# Patient Record
Sex: MALE | Race: WHITE | NOT HISPANIC OR LATINO | Employment: OTHER | URBAN - METROPOLITAN AREA
[De-identification: names, ages, dates, MRNs, and addresses within clinical notes are randomized per-mention and may not be internally consistent; named-entity substitution may affect disease eponyms.]

---

## 2017-03-23 ENCOUNTER — HOSPITAL ENCOUNTER (EMERGENCY)
Facility: OTHER | Age: 47
Discharge: HOME | End: 2017-03-23
Attending: EMERGENCY MEDICINE
Payer: OTHER MISCELLANEOUS

## 2017-03-23 VITALS
HEIGHT: 67 IN | WEIGHT: 178 LBS | BODY MASS INDEX: 27.94 KG/M2 | RESPIRATION RATE: 16 BRPM | DIASTOLIC BLOOD PRESSURE: 104 MMHG | TEMPERATURE: 98 F | OXYGEN SATURATION: 99 % | HEART RATE: 62 BPM | SYSTOLIC BLOOD PRESSURE: 179 MMHG

## 2017-03-23 DIAGNOSIS — M25.561 ACUTE PAIN OF RIGHT KNEE: ICD-10-CM

## 2017-03-23 DIAGNOSIS — M25.572 ACUTE LEFT ANKLE PAIN: ICD-10-CM

## 2017-03-23 DIAGNOSIS — T07.XXXA ABRASIONS OF MULTIPLE SITES: ICD-10-CM
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
## 2017-03-23 PROCEDURE — 25000003 PHARM REV CODE 250: Performed by: PHYSICIAN ASSISTANT

## 2017-03-23 PROCEDURE — 99283 EMERGENCY DEPT VISIT LOW MDM: CPT

## 2017-03-23 RX ORDER — HYDROCODONE BITARTRATE AND ACETAMINOPHEN 5; 325 MG/1; MG/1
2 TABLET ORAL
Status: DISCONTINUED | OUTPATIENT
Start: 2017-03-23 | End: 2017-03-23 | Stop reason: HOSPADM

## 2017-03-23 RX ORDER — IBUPROFEN 800 MG/1
800 TABLET ORAL EVERY 6 HOURS PRN
Qty: 20 TABLET | Refills: 0 | Status: SHIPPED | OUTPATIENT
Start: 2017-03-23 | End: 2018-11-18

## 2017-03-23 RX ORDER — IBUPROFEN 600 MG/1
600 TABLET ORAL
Status: COMPLETED | OUTPATIENT
Start: 2017-03-23 | End: 2017-03-23

## 2017-03-23 RX ORDER — HYDROCODONE BITARTRATE AND ACETAMINOPHEN 5; 325 MG/1; MG/1
1 TABLET ORAL EVERY 6 HOURS PRN
Qty: 12 TABLET | Refills: 0 | Status: SHIPPED | OUTPATIENT
Start: 2017-03-23 | End: 2017-04-02

## 2017-03-23 RX ADMIN — IBUPROFEN 600 MG: 600 TABLET, FILM COATED ORAL at 05:03
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
## 2018-01-02 ENCOUNTER — HOSPITAL ENCOUNTER (OUTPATIENT)
Facility: HOSPITAL | Age: 48
Discharge: HOME | End: 2018-01-02
Attending: EMERGENCY MEDICINE | Admitting: EMERGENCY MEDICINE

## 2018-01-02 ENCOUNTER — DOCUMENTATION ONLY (OUTPATIENT)
Dept: CARDIOLOGY | Facility: CLINIC | Age: 48
End: 2018-01-02

## 2018-01-02 VITALS
HEIGHT: 67 IN | WEIGHT: 180 LBS | OXYGEN SATURATION: 95 % | SYSTOLIC BLOOD PRESSURE: 112 MMHG | RESPIRATION RATE: 15 BRPM | BODY MASS INDEX: 28.25 KG/M2 | TEMPERATURE: 98 F | HEART RATE: 64 BPM | DIASTOLIC BLOOD PRESSURE: 66 MMHG

## 2018-01-02 DIAGNOSIS — J06.9 VIRAL URI WITH COUGH: ICD-10-CM

## 2018-01-02 DIAGNOSIS — R07.9 CHEST PAIN: Primary | ICD-10-CM

## 2018-01-02 PROBLEM — I10 ESSENTIAL HYPERTENSION: Status: ACTIVE | Noted: 2018-01-02

## 2018-01-02 PROBLEM — G44.89 OTHER HEADACHE SYNDROME: Status: ACTIVE | Noted: 2018-01-02

## 2018-01-02 PROBLEM — E78.49 OTHER HYPERLIPIDEMIA: Status: ACTIVE | Noted: 2018-01-02

## 2018-01-02 LAB
ALBUMIN SERPL BCP-MCNC: 3.9 G/DL
ALP SERPL-CCNC: 56 U/L
ALT SERPL W/O P-5'-P-CCNC: 58 U/L
ANION GAP SERPL CALC-SCNC: 10 MMOL/L
AST SERPL-CCNC: 28 U/L
BASOPHILS # BLD AUTO: 0.03 K/UL
BASOPHILS NFR BLD: 0.3 %
BILIRUB SERPL-MCNC: 0.5 MG/DL
BNP SERPL-MCNC: <10 PG/ML
BUN SERPL-MCNC: 18 MG/DL
CALCIUM SERPL-MCNC: 9.9 MG/DL
CHLORIDE SERPL-SCNC: 103 MMOL/L
CHOLEST SERPL-MCNC: 198 MG/DL
CHOLEST/HDLC SERPL: 6.6 {RATIO}
CO2 SERPL-SCNC: 22 MMOL/L
CREAT SERPL-MCNC: 1.1 MG/DL
DIASTOLIC DYSFUNCTION: NO
DIFFERENTIAL METHOD: ABNORMAL
EOSINOPHIL # BLD AUTO: 0.1 K/UL
EOSINOPHIL NFR BLD: 1.1 %
ERYTHROCYTE [DISTWIDTH] IN BLOOD BY AUTOMATED COUNT: 12.6 %
EST. GFR  (AFRICAN AMERICAN): >60 ML/MIN/1.73 M^2
EST. GFR  (NON AFRICAN AMERICAN): >60 ML/MIN/1.73 M^2
ESTIMATED PA SYSTOLIC PRESSURE: 27.8
GLUCOSE SERPL-MCNC: 90 MG/DL
HCT VFR BLD AUTO: 42.3 %
HDLC SERPL-MCNC: 30 MG/DL
HDLC SERPL: 15.2 %
HGB BLD-MCNC: 15 G/DL
IMM GRANULOCYTES # BLD AUTO: 0.05 K/UL
IMM GRANULOCYTES NFR BLD AUTO: 0.6 %
INR PPP: 1
LDLC SERPL CALC-MCNC: 142.4 MG/DL
LIPASE SERPL-CCNC: 15 U/L
LYMPHOCYTES # BLD AUTO: 1.8 K/UL
LYMPHOCYTES NFR BLD: 19.8 %
MCH RBC QN AUTO: 31 PG
MCHC RBC AUTO-ENTMCNC: 35.5 G/DL
MCV RBC AUTO: 87 FL
MONOCYTES # BLD AUTO: 0.8 K/UL
MONOCYTES NFR BLD: 9.2 %
NEUTROPHILS # BLD AUTO: 6.3 K/UL
NEUTROPHILS NFR BLD: 69 %
NONHDLC SERPL-MCNC: 168 MG/DL
NRBC BLD-RTO: 0 /100 WBC
PLATELET # BLD AUTO: 163 K/UL
PMV BLD AUTO: 9.5 FL
POTASSIUM SERPL-SCNC: 3.9 MMOL/L
PROT SERPL-MCNC: 8.2 G/DL
PROTHROMBIN TIME: 10.7 SEC
RBC # BLD AUTO: 4.84 M/UL
RETIRED EF AND QEF - SEE NOTES: 65 (ref 55–65)
SODIUM SERPL-SCNC: 135 MMOL/L
TRIGL SERPL-MCNC: 128 MG/DL
TROPONIN I SERPL DL<=0.01 NG/ML-MCNC: <0.006 NG/ML
TROPONIN I SERPL DL<=0.01 NG/ML-MCNC: <0.006 NG/ML
WBC # BLD AUTO: 9.09 K/UL

## 2018-01-02 PROCEDURE — 25000003 PHARM REV CODE 250: Performed by: EMERGENCY MEDICINE

## 2018-01-02 PROCEDURE — 96374 THER/PROPH/DIAG INJ IV PUSH: CPT

## 2018-01-02 PROCEDURE — 93351 STRESS TTE COMPLETE: CPT | Mod: 26,,, | Performed by: INTERNAL MEDICINE

## 2018-01-02 PROCEDURE — 99284 EMERGENCY DEPT VISIT MOD MDM: CPT | Mod: 25

## 2018-01-02 PROCEDURE — 63600175 PHARM REV CODE 636 W HCPCS: Performed by: HOSPITALIST

## 2018-01-02 PROCEDURE — 80061 LIPID PANEL: CPT

## 2018-01-02 PROCEDURE — 93325 DOPPLER ECHO COLOR FLOW MAPG: CPT | Mod: 26,,, | Performed by: INTERNAL MEDICINE

## 2018-01-02 PROCEDURE — 93010 ELECTROCARDIOGRAM REPORT: CPT | Mod: ,,, | Performed by: INTERNAL MEDICINE

## 2018-01-02 PROCEDURE — 99220 PR INITIAL OBSERVATION CARE,LEVL III: CPT | Mod: ,,, | Performed by: HOSPITALIST

## 2018-01-02 PROCEDURE — G0378 HOSPITAL OBSERVATION PER HR: HCPCS

## 2018-01-02 PROCEDURE — 96372 THER/PROPH/DIAG INJ SC/IM: CPT

## 2018-01-02 PROCEDURE — 83690 ASSAY OF LIPASE: CPT

## 2018-01-02 PROCEDURE — 93320 DOPPLER ECHO COMPLETE: CPT | Mod: 26,,, | Performed by: INTERNAL MEDICINE

## 2018-01-02 PROCEDURE — 93325 DOPPLER ECHO COLOR FLOW MAPG: CPT

## 2018-01-02 PROCEDURE — 80053 COMPREHEN METABOLIC PANEL: CPT

## 2018-01-02 PROCEDURE — 85025 COMPLETE CBC W/AUTO DIFF WBC: CPT

## 2018-01-02 PROCEDURE — 84484 ASSAY OF TROPONIN QUANT: CPT | Mod: 91

## 2018-01-02 PROCEDURE — 25000003 PHARM REV CODE 250: Performed by: HOSPITALIST

## 2018-01-02 PROCEDURE — 93005 ELECTROCARDIOGRAM TRACING: CPT

## 2018-01-02 PROCEDURE — 83880 ASSAY OF NATRIURETIC PEPTIDE: CPT

## 2018-01-02 PROCEDURE — 99285 EMERGENCY DEPT VISIT HI MDM: CPT | Mod: ,,, | Performed by: EMERGENCY MEDICINE

## 2018-01-02 PROCEDURE — 84484 ASSAY OF TROPONIN QUANT: CPT

## 2018-01-02 PROCEDURE — 85610 PROTHROMBIN TIME: CPT

## 2018-01-02 RX ORDER — BUTALBITAL, ACETAMINOPHEN AND CAFFEINE 50; 325; 40 MG/1; MG/1; MG/1
1 TABLET ORAL
Status: COMPLETED | OUTPATIENT
Start: 2018-01-02 | End: 2018-01-02

## 2018-01-02 RX ORDER — CETIRIZINE HYDROCHLORIDE 5 MG/1
5 TABLET ORAL DAILY
Status: DISCONTINUED | OUTPATIENT
Start: 2018-01-02 | End: 2018-01-02 | Stop reason: HOSPADM

## 2018-01-02 RX ORDER — SODIUM CHLORIDE 0.9 % (FLUSH) 0.9 %
3 SYRINGE (ML) INJECTION
Status: DISCONTINUED | OUTPATIENT
Start: 2018-01-02 | End: 2018-01-02 | Stop reason: HOSPADM

## 2018-01-02 RX ORDER — NITROGLYCERIN 0.4 MG/1
0.4 TABLET SUBLINGUAL EVERY 5 MIN PRN
Status: DISCONTINUED | OUTPATIENT
Start: 2018-01-02 | End: 2018-01-02 | Stop reason: HOSPADM

## 2018-01-02 RX ORDER — ATORVASTATIN CALCIUM 20 MG/1
20 TABLET, FILM COATED ORAL DAILY
Status: DISCONTINUED | OUTPATIENT
Start: 2018-01-02 | End: 2018-01-02 | Stop reason: HOSPADM

## 2018-01-02 RX ORDER — ASPIRIN 325 MG
325 TABLET ORAL
Status: COMPLETED | OUTPATIENT
Start: 2018-01-02 | End: 2018-01-02

## 2018-01-02 RX ORDER — FLUTICASONE PROPIONATE 50 MCG
2 SPRAY, SUSPENSION (ML) NASAL DAILY
Status: DISCONTINUED | OUTPATIENT
Start: 2018-01-02 | End: 2018-01-02 | Stop reason: HOSPADM

## 2018-01-02 RX ORDER — ONDANSETRON 2 MG/ML
4 INJECTION INTRAMUSCULAR; INTRAVENOUS EVERY 8 HOURS PRN
Status: DISCONTINUED | OUTPATIENT
Start: 2018-01-02 | End: 2018-01-02 | Stop reason: HOSPADM

## 2018-01-02 RX ORDER — LISINOPRIL 5 MG/1
5 TABLET ORAL DAILY
Status: DISCONTINUED | OUTPATIENT
Start: 2018-01-02 | End: 2018-01-02 | Stop reason: HOSPADM

## 2018-01-02 RX ORDER — TRAMADOL HYDROCHLORIDE 50 MG/1
50 TABLET ORAL EVERY 6 HOURS PRN
Status: DISCONTINUED | OUTPATIENT
Start: 2018-01-02 | End: 2018-01-02 | Stop reason: HOSPADM

## 2018-01-02 RX ORDER — ENOXAPARIN SODIUM 100 MG/ML
40 INJECTION SUBCUTANEOUS EVERY 24 HOURS
Status: DISCONTINUED | OUTPATIENT
Start: 2018-01-02 | End: 2018-01-02 | Stop reason: HOSPADM

## 2018-01-02 RX ORDER — HYDROCODONE BITARTRATE AND ACETAMINOPHEN 10; 325 MG/1; MG/1
1 TABLET ORAL EVERY 6 HOURS PRN
Status: DISCONTINUED | OUTPATIENT
Start: 2018-01-02 | End: 2018-01-02 | Stop reason: HOSPADM

## 2018-01-02 RX ORDER — ACETAMINOPHEN 325 MG/1
650 TABLET ORAL EVERY 4 HOURS PRN
Status: DISCONTINUED | OUTPATIENT
Start: 2018-01-02 | End: 2018-01-02 | Stop reason: HOSPADM

## 2018-01-02 RX ADMIN — ASPIRIN 325 MG ORAL TABLET 325 MG: 325 PILL ORAL at 01:01

## 2018-01-02 RX ADMIN — NITROGLYCERIN 0.4 MG: 0.4 TABLET SUBLINGUAL at 02:01

## 2018-01-02 RX ADMIN — ATORVASTATIN CALCIUM 20 MG: 20 TABLET, FILM COATED ORAL at 10:01

## 2018-01-02 RX ADMIN — HYDROCODONE BITARTRATE AND ACETAMINOPHEN 1 TABLET: 10; 325 TABLET ORAL at 09:01

## 2018-01-02 RX ADMIN — NITROGLYCERIN 0.5 INCH: 20 OINTMENT TOPICAL at 05:01

## 2018-01-02 RX ADMIN — LISINOPRIL 5 MG: 5 TABLET ORAL at 09:01

## 2018-01-02 RX ADMIN — HYDROCODONE BITARTRATE AND ACETAMINOPHEN 1 TABLET: 10; 325 TABLET ORAL at 05:01

## 2018-01-02 RX ADMIN — ONDANSETRON 4 MG: 2 INJECTION INTRAMUSCULAR; INTRAVENOUS at 07:01

## 2018-01-02 RX ADMIN — ENOXAPARIN SODIUM 40 MG: 100 INJECTION SUBCUTANEOUS at 05:01

## 2018-01-02 RX ADMIN — BUTALBITAL, ACETAMINOPHEN AND CAFFEINE 1 TABLET: 50; 325; 40 TABLET ORAL at 05:01

## 2018-01-02 RX ADMIN — ACETAMINOPHEN 650 MG: 325 TABLET ORAL at 07:01

## 2018-01-02 RX ADMIN — CETIRIZINE HYDROCHLORIDE 5 MG: 5 TABLET ORAL at 09:01

## 2018-01-02 NOTE — ED NOTES
Pt placed on cardiac monitor, continuous pulse ox, cycling blood pressures. Side rails up x2, call bell in reach, bed in low position with brake engaged. Pt remains NPO, waiting for room assignment in hospital. Offers no c/o's at this time.

## 2018-01-02 NOTE — ED TRIAGE NOTES
Danielito Rich, a 47 y.o. male presents to the ED      Chief Complaint   Patient presents with    Chest Pain     Midsternal chest pain that radiates across his chest described as sharp and shooting accompanied by headache and nausea without emesis. Pt reports hx of htn and high cholesterol, reports he has not taken htn or hdl medications since 2013.      Shortness of Breath     SOB worse when walking.      Review of patient's allergies indicates:   Allergen Reactions    Codeine Itching     Past Medical History:   Diagnosis Date    Hypertension       Adult Physical Assessment  LOC: Danielito Rich, 47 y.o. male verified via two identifiers.  The patient is awake, alert, oriented and speaking appropriately at this time.  APPEARANCE: Patient resting comfortably and appears to be in no acute distress at this time. Patient is clean and well groomed, patient's clothing is properly fastened.  SKIN:The skin is warm and dry, color consistent with ethnicity, patient has normal skin turgor and moist mucus membranes, skin intact, no breakdown or brusing noted.  MUSCULOSKELETAL: Patient moving all extremities well, no obvious swelling or deformities noted.  RESPIRATORY: Airway is open and patent, respirations are spontaneous, patient has a normal effort and rate, no accessory muscle use noted.  CARDIAC: Patient has a normal rate and rhythm, no periphreal edema noted in any extremity, capillary refill < 3 seconds in all extremities  ABDOMEN: Soft and non tender to palpation, no abdominal distention noted. Bowel sounds present in all four quadrants.  NEUROLOGIC: Eyes open spontaneously, behavior appropriate to situation, follows commands, facial expression symmetrical, bilateral hand grasp equal and even, purposeful motor response noted, normal sensation in all extremities when touched with a finger.
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
## 2018-01-02 NOTE — PROVIDER PROGRESS NOTES - EMERGENCY DEPT.
Encounter Date: 1/2/2018    ED Physician Progress Notes         EKG - STEMI Decision  Initial Reading: No STEMI present.

## 2018-01-02 NOTE — PROGRESS NOTES
Patient identified by 2 identifiers. Denies previous reactions to blood transfusions and allergies reviewed.  Procedure explained & consent obtained.  20 g IV in place to Rt AC, flushed w/ 10cc NS pre & post contrast administration.  3cc Optison administered, echo images obtained.  Pt tolerated procedure well.

## 2018-01-02 NOTE — ED NOTES
Pt placed on tele box #[id number]- stress echo lab notified that pt may now go to stress. Monitoring confirmed w/ tele room

## 2018-01-02 NOTE — ED PROVIDER NOTES
Encounter Date: 1/2/2018    SCRIBE #1 NOTE: I, Dorina Thompson, am scribing for, and in the presence of,  Dr. Jalloh . I have scribed the entire note.       History     Chief Complaint   Patient presents with    Chest Pain     Midsternal chest pain that radiates across his chest described as sharp and shooting accompanied by headache and nausea without emesis. Pt reports hx of htn and high cholesterol, reports he has not taken htn or hdl medications since 2013.      Shortness of Breath     SOB worse when walking.      The history is provided by the patient and medical records.      Time patient was seen by the provider: 1:40 AM      The patient is a 47 y.o. male with hx of: HTN and hypercholesterolemia who presents to the ED with a complaint of chest pain and shortness of breath. Patient states that he has been sick since Friday with a cold. He started taking Robitussin Saturday and has had a productive cough with yellowish, greenish phlegm. He also has associated symptoms of intermittent chest pain with radiation to shoulder that started at noon, fatigue, generalized body aches, diarrhea, vomiting, sore throat, loss of appetite, and headache. Patient states that his headache is generalized and feels like his head is going to blow, not like a headache he has had before. His most recent episode of chest pain was while waiting in the waiting room, but he did not notice the time. He states that he actively has pressure in his chest at this time. He denies fever and rhinorrhea. He reports that he loss his dad due to heart failure and also does not have any insurance at this time. He has not been compliant with his medications since 2003.    States his CP radiates to his back and left shoulder.  Did state he was admitted to [facility] recently for CP and they wanted to do stress test on him, but he refused at that time and left AMA because he is  and had to work.    He is amenable to being admitted for ACS  w/u at this time.    Review of patient's allergies indicates:   Allergen Reactions    Codeine Itching     No past medical history on file.  Past Surgical History:   Procedure Laterality Date    APPENDECTOMY      FRACTURE SURGERY       No family history on file.  Social History   Substance Use Topics    Smoking status: Never Smoker    Smokeless tobacco: Not on file    Alcohol use No     Review of Systems   Constitutional: Positive for appetite change and fever. Negative for fatigue.   HENT: Positive for sore throat. Negative for rhinorrhea.    Respiratory: Positive for cough, chest tightness and shortness of breath.    Cardiovascular: Positive for chest pain.   Gastrointestinal: Positive for diarrhea and vomiting.   Musculoskeletal:        (+)Genrealized body aches   Neurological: Positive for headaches.   All other systems reviewed and are negative.      Physical Exam     Initial Vitals [01/02/18 0024]   BP Pulse Resp Temp SpO2   (!) 157/99 91 20 98 °F (36.7 °C) 98 %      MAP       118.33         Physical Exam    Nursing note and vitals reviewed.      Gen/Constitutional: Interactive. No acute distress  Head: Normocephalic, Atraumatic  Neck: supple, no masses or LAD  Eyes: PERRLA, conjunctiva clear  Ears, Nose and Throat: No rhinorrhea or stridor.  Cardiac: Reg Rhythm, No murmur  Pulmonary: CTA Bilat, no wheezes, rales. Minimal scattered rhonchi.   GI: Abdomen soft, non-tender, non-distended; no rebound or guarding  : No CVA tenderness.  Musculoskeletal: Extremities warm, well perfused, no erythema, no edema  Skin: No rashes  Neuro: Alert and Oriented x 3; No focal motor or sensory deficits.    Psych: Normal affect    ED Course   Procedures  Labs Reviewed   CBC W/ AUTO DIFFERENTIAL - Abnormal; Notable for the following:        Result Value    Immature Granulocytes 0.6 (*)     Immature Grans (Abs) 0.05 (*)     All other components within normal limits   COMPREHENSIVE METABOLIC PANEL - Abnormal; Notable for the  following:     Sodium 135 (*)     CO2 22 (*)     ALT 58 (*)     All other components within normal limits   B-TYPE NATRIURETIC PEPTIDE   LIPASE   PROTIME-INR   TROPONIN I   TROPONIN I         Imaging Results          X-Ray Chest 1 View (Final result)  Result time 01/02/18 01:40:14    Final result by Brennan Heart MD (01/02/18 01:40:14)                 Impression:      No radiographic evidence of acute intrathoracic process.      Electronically signed by: BRENNAN HEART  Date:     01/02/18  Time:    01:40              Narrative:    Comparison:None    Technique: Single AP portable chest radiograph.    Findings:   Cardiac monitoring leads overlie the chest. The cardiomediastinal silhouette appears within normal limits. The lungs are symmetrically expanded without evidence of focal airspace consolidation or pleural effusion. No evidence of pneumothorax. The visualized osseous structures are intact.                                   Medical Decision Making:   History:   Old Medical Records: I decided to obtain old medical records.  Initial Assessment:   Patient presents with cough, chest congestion, headache that is not thunderclap and consistent with prior headaches he has had when his blood pressure gets high, as well as chest pressure and intermittent chest pain that is substernal and radiates to his back shoulder. I considered viral URI, influenza, PE, MI/ACS, aortic dissection, pneumonia, CHF exacerbation, among other diagnoses. Plan to check cardiac labs, chest x-ray. I will treat HTN and chest pain with nitroglycerin, Asprin given.     EKG non-ischemic.  Initial trop negative.  Last episode of CP was just prior to being roomed, so about an hour ago.    Given hx of HTN and HLD and never had cardiac risk stratification and non-compliance with BP medications, I felt he warranted admit for r/o ACS.    HIs CP improved with SL Nitro and he is CP free with nitro paste.    Medicine accepted for r/o ACS  admission.  Clinical Tests:   Lab Tests: Ordered and Reviewed  Radiological Study: Ordered and Reviewed  Medical Tests: Ordered and Reviewed            Scribe Attestation:   Scribe #1: I performed the above scribed service and the documentation accurately describes the services I performed. I attest to the accuracy of the note.    I, Dr. Rambo Jalloh, personally performed the services described in this documentation. All medical record entries made by the scribe were at my direction and in my presence.  I have reviewed the chart and agree that the record reflects my personal performance and is accurate and complete. Rambo Jalloh MD.  7:18 AM 01/02/2018            ED Course      Clinical Impression:   The primary encounter diagnosis was Chest pain. A diagnosis of Viral URI with cough was also pertinent to this visit.                           Rambo Jalloh MD  01/02/18 0719

## 2018-01-02 NOTE — H&P
"History & Physical  [facility] [place] [facility]    SUBJECTIVE:   Chief Complaint/Reason for Admission: chest pain    History of Present Illness:  Patient is a 47 y.o. male with PMH of untreated HTN and HLD presents to the ED with chest pain.  Pt reports that he has been ill for the past 4 days with a cold, with non-productive cough and sinus pressure.  Over the past 2 days he has also noticed chest pain which began at rest.  Pain is characterized as "heavy" and pressure-like, mid-sternal, with intermittent sharp pains.  No clear exacerbating or relieving factors.  He was given NTG in ED and since then chest pain has improved.  He reports that his PCP wanted him to have a stress test recently, but it has not been done yet.  No FH of MI/CAD.  Does not normally have chest pain with exertion.  Non-smoker, no alcohol or drug use.  Works as a ; feels his job is high-stress.     In addition, pt has had a throbbing HA for the past 2 days.  He reports that he had his blood pressure checked at [facility], where it was found to be 167/123 and it was recommended he come to ED.  +similar HA in past, but details or cause not known.  Pain is at the top of his head, feels like it will "blow up".  No vision changes, confusion, fever, or chills.  He stopped anti-hypertensives in 2003 at time of divorce.    Old chart was reviewed.    Past Medical History:   Diagnosis Date    Hypertension        Past Surgical History:   Procedure Laterality Date    APPENDECTOMY      FRACTURE SURGERY        No family history on file.    Social History   Substance Use Topics    Smoking status: Never Smoker    Smokeless tobacco: Not on file    Alcohol use No      Review of patient's allergies indicates:   Allergen Reactions    Codeine Itching       No current facility-administered medications on file prior to encounter.      Current Outpatient Prescriptions on File Prior to Encounter   Medication Sig Dispense Refill    hydrocortisone-pramoxine " (PROCTOFOAM-HS) rectal foam Place 1 applicator rectally 2 (two) times daily. 10 g 1    ibuprofen (ADVIL,MOTRIN) 800 MG tablet Take 1 tablet (800 mg total) by mouth every 6 (six) hours as needed for Pain (take with food). 20 tablet 0        Review of Systems:  Constitutional: no fever or chills  Eyes: no visual changes  ENT: positive for nasal congestion and sore throat, negative for hearing loss and voice change  Respiratory: positive for cough  Cardiovascular: positive for chest pain and chest pressure/discomfort  Gastrointestinal: no nausea or vomiting, no abdominal pain or change in bowel habits  Genitourinary: no hematuria or dysuria  Musculoskeletal: no arthralgias or myalgias  Neurological: no seizures or tremors  Endocrine: no heat or cold intolerance     OBJECTIVE:     Vital Signs (Most Recent)   Temp: 98 °F (36.7 °C) (01/02/18 0024)  Pulse: 81 (01/02/18 0717)  Resp: 20 (01/02/18 0024)  BP: 122/75 (01/02/18 0717)  SpO2: 96 % (01/02/18 0717)  Body mass index is 28.19 kg/m².      Physical Exam:  Gen- well-developed, well-nourished, fatigued  Head- NC/AT, PERRL, EOMI, no oral lesions.  +maxillary sinus tenderness  Neck- supple, trachea midline, no cervical LAD  CVS- S1 and S2 present, RRR, no murmurs  Resp- coarse breath sounds b/l, no work of breathing  Abd- BS+, soft, NT, ND  Ext- no clubbing, cyanosis, or edema  Skin- warm, dry, no lesions  Neuro- alert and oriented x 3, CN grossly intact, no focal deficits    Laboratory:  CBC/Anemia Labs: Coags:      Recent Labs  Lab 01/02/18 0133   WBC 9.09   HGB 15.0   HCT 42.3      MCV 87   RDW 12.6      Recent Labs  Lab 01/02/18 0133   INR 1.0        Chemistries: ABG:     Recent Labs  Lab 01/02/18 0133   *   K 3.9      CO2 22*   BUN 18   CREATININE 1.1   CALCIUM 9.9   PROT 8.2   BILITOT 0.5   ALKPHOS 56   ALT 58*   AST 28    No results for input(s): PH, PCO2, PO2, HCO3, POCSATURATED, BE in the last 168 hours.       Cardiac Enzymes: Ejection  Fractions:    Recent Labs      01/02/18   0133  01/02/18   0746   TROPONINI  <0.006  <0.006    No results found for: EF       Diagnostic Results:  Labs: Reviewed  ECG: Reviewed  X-Ray: Reviewed      ASSESSMENT/PLAN:     Chest Pain  -pt has RF of HTN, HLD, but otherwise low MIKEY score  -Trop negative x 2  -EKG without ischemic changes  -ASA 325mg x1, NTG PRN  -DDX includes coronary ischemia, costochondritis, musculoskeletal, or GERD  -will plan for exercise stress ECHO with color flow doppler    Headache  -no focal neurologic deficits, no indication for imaging  -may be related to HTN, vs migraine vs sinus HA  -will start Flonase, Zyrtec  -trial of Fiorcet  -control blood pressure    Essential Hypertension  -uncontrolled  -will start Lisinopril 5mg daily  -will need close f/u to titrate    Hyperlipidemia  -pt has previously treated this with homeopathic regimens (?oatmeal)  -check lipid panel  -will start low-dose statin    DVT ppx- Lovenox  CODE status- FULL    Dispo- home pending pain control and results of stress test    Dulce Ramirez MD  Hospital Medicine Staff

## 2018-01-03 NOTE — DISCHARGE SUMMARY
"DISCHARGE SUMMARY  Hospital Medicine    [place] [facility]    Patient Name: Danielito Rich  YOB: 1970    Admit Date: 1/2/2018    Discharge Date: 1/2/2018    Discharge Attending Physician: Dulce Ramirez MD    Principal Diagnoses:  Active Hospital Problems    Diagnosis  POA    *Chest pain [R07.9]  Yes    Other headache syndrome [G44.89]  Yes    Essential hypertension [I10]  Yes    Other hyperlipidemia [E78.4]  Yes      Resolved Hospital Problems    Diagnosis Date Resolved POA   No resolved problems to display.       Discharged Condition: stable    HOSPITAL COURSE:      Initial Presentation:    Patient is a 47 y.o. male with PMH of untreated HTN and HLD presents to the ED with chest pain.  Pt reports that he has been ill for the past 4 days with a cold, with non-productive cough and sinus pressure.  Over the past 2 days he has also noticed chest pain which began at rest.  Pain is characterized as "heavy" and pressure-like, mid-sternal, with intermittent sharp pains.  No clear exacerbating or relieving factors.  He was given NTG in ED and since then chest pain has improved.  He reports that his PCP wanted him to have a stress test recently, but it has not been done yet.  No FH of MI/CAD.  Does not normally have chest pain with exertion.  Non-smoker, no alcohol or drug use.  Works as a ; feels his job is high-stress.     In addition, pt has had a throbbing HA for the past 2 days.  He reports that he had his blood pressure checked at [facility], where it was found to be 167/123 and it was recommended he come to ED.  +similar HA in past, but details or cause not known.  Pain is at the top of his head, feels like it will "blow up".  No vision changes, confusion, fever, or chills.  He stopped anti-hypertensives in 2003 at time of divorce.    Course of Principle Problem for Admission:    Pt was [place] to [facility] [place] of chest pain.  Troponin negative x 2 and EKG WNL.  He went for " exercise stress ECHO which was negative for ischemia.  Chest pain had resolved by the evening, and HA and blood pressure had also improved.  He was discharged home in stable condition.    Other Medical Problems Addressed in the Hospital:    Headache  -no focal neurologic deficits, no indication for imaging  -may be related to HTN, vs migraine vs sinus HA  -will start Flonase, Zyrtec  -trial of Fiorcet  -control blood pressure      Consults: None    Last CBC/BMP:    CBC/Anemia Labs: Coags:      Recent Labs  Lab 01/02/18  0133   WBC 9.09   HGB 15.0   HCT 42.3      MCV 87   RDW 12.6      Recent Labs  Lab 01/02/18  0133   INR 1.0        Chemistries:     Recent Labs  Lab 01/02/18 0133   *   K 3.9      CO2 22*   BUN 18   CREATININE 1.1   CALCIUM 9.9   PROT 8.2   BILITOT 0.5   ALKPHOS 56   ALT 58*   AST 28            Significant Diagnostic Studies:     Exercise Stress ECHO 1/2:    EKG Conclusions:    1. The EKG portion of this study is negative for ischemia at a high workload, and peak heart rate of 148 bpm (85% of predicted).   2. Blood pressure response to exercise was normal (Presenting BP: 134/91 Peak BP: 169/95).   3. No significant arrhythmias were present.   4. There were no symptoms of chest discomfort or significant dyspnea throughout the protocol.   5. The Herman treadmill score was 7 suggesting a low probability for future cardiovascular events.      CONCLUSIONS     1 - Normal left ventricular systolic function (EF 60-65%).     2 - Normal right ventricular systolic function .     3 - Normal left ventricular diastolic function.     4 - The estimated PA systolic pressure is 28 mmHg.     No evidence of stress induced myocardial ischemia.     Special Treatments/Procedures:   * No surgery found *     Disposition: Home or Self Care    Discharge Medication List:       Hilario Danielito Gilliland   Home Medication Instructions LISE:[id number]    Printed on:01/03/18 1100   Medication Information                       hydrocortisone-pramoxine (PROCTOFOAM-HS) rectal foam  Place 1 applicator rectally 2 (two) times daily.             ibuprofen (ADVIL,MOTRIN) 800 MG tablet  Take 1 tablet (800 mg total) by mouth every 6 (six) hours as needed for Pain (take with food).                 Patient Instructions:    Discharge Procedure Orders  Activity as tolerated     Notify your health care provider if you experience any of the following:  severe uncontrolled pain     Notify your health care provider if you experience any of the following:  difficulty breathing or increased cough     Notify your health care provider if you experience any of the following:  severe persistent headache         At the time of discharge patient was told to take all medications as prescribed, to keep all followup appointments, and to call their primary care physician or return to the emergency room if they have any worsening or concerning symptoms.    Signing Physician:  Dulce Ramirez MD

## 2018-11-18 ENCOUNTER — HOSPITAL ENCOUNTER (OUTPATIENT)
Facility: HOSPITAL | Age: 48
Discharge: HOME | End: 2018-11-19
Attending: EMERGENCY MEDICINE | Admitting: ORTHOPAEDIC SURGERY
Payer: MEDICAID

## 2018-11-18 ENCOUNTER — ANESTHESIA EVENT (OUTPATIENT)
Dept: SURGERY | Facility: HOSPITAL | Age: 48
End: 2018-11-18
Payer: MEDICAID

## 2018-11-18 DIAGNOSIS — L08.9 FINGER INFECTION: Primary | ICD-10-CM

## 2018-11-18 DIAGNOSIS — I10 HYPERTENSION, UNSPECIFIED TYPE: ICD-10-CM

## 2018-11-18 LAB
ALBUMIN SERPL BCP-MCNC: 4.5 G/DL
ALP SERPL-CCNC: 74 U/L
ALT SERPL W/O P-5'-P-CCNC: 106 U/L
ANION GAP SERPL CALC-SCNC: 11 MMOL/L
AST SERPL-CCNC: 62 U/L
BASOPHILS # BLD AUTO: 0.03 K/UL
BASOPHILS NFR BLD: 0.4 %
BILIRUB SERPL-MCNC: 0.5 MG/DL
BUN SERPL-MCNC: 21 MG/DL
CALCIUM SERPL-MCNC: 10.6 MG/DL
CHLORIDE SERPL-SCNC: 106 MMOL/L
CO2 SERPL-SCNC: 26 MMOL/L
CREAT SERPL-MCNC: 1.1 MG/DL
CRP SERPL-MCNC: 2.9 MG/L
DIFFERENTIAL METHOD: ABNORMAL
EOSINOPHIL # BLD AUTO: 0.1 K/UL
EOSINOPHIL NFR BLD: 1.1 %
ERYTHROCYTE [DISTWIDTH] IN BLOOD BY AUTOMATED COUNT: 12.8 %
ERYTHROCYTE [SEDIMENTATION RATE] IN BLOOD BY WESTERGREN METHOD: 8 MM/HR
EST. GFR  (AFRICAN AMERICAN): >60 ML/MIN/1.73 M^2
EST. GFR  (NON AFRICAN AMERICAN): >60 ML/MIN/1.73 M^2
GLUCOSE SERPL-MCNC: 68 MG/DL
HCT VFR BLD AUTO: 42.8 %
HGB BLD-MCNC: 15.2 G/DL
IMM GRANULOCYTES # BLD AUTO: 0.03 K/UL
IMM GRANULOCYTES NFR BLD AUTO: 0.4 %
LYMPHOCYTES # BLD AUTO: 2.4 K/UL
LYMPHOCYTES NFR BLD: 28.5 %
MCH RBC QN AUTO: 31.3 PG
MCHC RBC AUTO-ENTMCNC: 35.5 G/DL
MCV RBC AUTO: 88 FL
MONOCYTES # BLD AUTO: 0.8 K/UL
MONOCYTES NFR BLD: 10.2 %
NEUTROPHILS # BLD AUTO: 4.9 K/UL
NEUTROPHILS NFR BLD: 59.4 %
NRBC BLD-RTO: 0 /100 WBC
PLATELET # BLD AUTO: 188 K/UL
PMV BLD AUTO: 9.3 FL
POTASSIUM SERPL-SCNC: 4.1 MMOL/L
PROT SERPL-MCNC: 8.4 G/DL
RBC # BLD AUTO: 4.86 M/UL
SODIUM SERPL-SCNC: 143 MMOL/L
WBC # BLD AUTO: 8.25 K/UL

## 2018-11-18 PROCEDURE — 63600175 PHARM REV CODE 636 W HCPCS: Performed by: EMERGENCY MEDICINE

## 2018-11-18 PROCEDURE — 96365 THER/PROPH/DIAG IV INF INIT: CPT

## 2018-11-18 PROCEDURE — 90715 TDAP VACCINE 7 YRS/> IM: CPT | Performed by: EMERGENCY MEDICINE

## 2018-11-18 PROCEDURE — 90471 IMMUNIZATION ADMIN: CPT | Performed by: EMERGENCY MEDICINE

## 2018-11-18 PROCEDURE — 25000003 PHARM REV CODE 250: Performed by: STUDENT IN AN ORGANIZED HEALTH CARE EDUCATION/TRAINING PROGRAM

## 2018-11-18 PROCEDURE — 63600175 PHARM REV CODE 636 W HCPCS: Performed by: PHYSICIAN ASSISTANT

## 2018-11-18 PROCEDURE — 80053 COMPREHEN METABOLIC PANEL: CPT

## 2018-11-18 PROCEDURE — G0378 HOSPITAL OBSERVATION PER HR: HCPCS

## 2018-11-18 PROCEDURE — 85025 COMPLETE CBC W/AUTO DIFF WBC: CPT

## 2018-11-18 PROCEDURE — 99285 EMERGENCY DEPT VISIT HI MDM: CPT | Mod: 25

## 2018-11-18 PROCEDURE — 96366 THER/PROPH/DIAG IV INF ADDON: CPT

## 2018-11-18 PROCEDURE — 25000003 PHARM REV CODE 250: Performed by: PHYSICIAN ASSISTANT

## 2018-11-18 PROCEDURE — 86140 C-REACTIVE PROTEIN: CPT

## 2018-11-18 PROCEDURE — 96375 TX/PRO/DX INJ NEW DRUG ADDON: CPT

## 2018-11-18 PROCEDURE — 85652 RBC SED RATE AUTOMATED: CPT

## 2018-11-18 PROCEDURE — 63600175 PHARM REV CODE 636 W HCPCS: Performed by: STUDENT IN AN ORGANIZED HEALTH CARE EDUCATION/TRAINING PROGRAM

## 2018-11-18 PROCEDURE — 99285 EMERGENCY DEPT VISIT HI MDM: CPT | Mod: ,,, | Performed by: PHYSICIAN ASSISTANT

## 2018-11-18 PROCEDURE — 96376 TX/PRO/DX INJ SAME DRUG ADON: CPT

## 2018-11-18 RX ORDER — MORPHINE SULFATE 4 MG/ML
6 INJECTION, SOLUTION INTRAMUSCULAR; INTRAVENOUS
Status: COMPLETED | OUTPATIENT
Start: 2018-11-18 | End: 2018-11-18

## 2018-11-18 RX ORDER — ONDANSETRON 4 MG/1
8 TABLET, FILM COATED ORAL EVERY 6 HOURS PRN
Status: DISCONTINUED | OUTPATIENT
Start: 2018-11-18 | End: 2018-11-19 | Stop reason: HOSPADM

## 2018-11-18 RX ORDER — PANTOPRAZOLE SODIUM 40 MG/1
40 TABLET, DELAYED RELEASE ORAL DAILY
Status: DISCONTINUED | OUTPATIENT
Start: 2018-11-19 | End: 2018-11-19 | Stop reason: HOSPADM

## 2018-11-18 RX ORDER — CEFAZOLIN SODIUM 1 G/3ML
1 INJECTION, POWDER, FOR SOLUTION INTRAMUSCULAR; INTRAVENOUS
Status: COMPLETED | OUTPATIENT
Start: 2018-11-18 | End: 2018-11-18

## 2018-11-18 RX ORDER — DIPHENHYDRAMINE HYDROCHLORIDE 50 MG/ML
25 INJECTION INTRAMUSCULAR; INTRAVENOUS
Status: COMPLETED | OUTPATIENT
Start: 2018-11-18 | End: 2018-11-18

## 2018-11-18 RX ORDER — MORPHINE SULFATE 4 MG/ML
4 INJECTION, SOLUTION INTRAMUSCULAR; INTRAVENOUS
Status: COMPLETED | OUTPATIENT
Start: 2018-11-18 | End: 2018-11-18

## 2018-11-18 RX ORDER — CIPROFLOXACIN 2 MG/ML
400 INJECTION, SOLUTION INTRAVENOUS
Status: DISCONTINUED | OUTPATIENT
Start: 2018-11-18 | End: 2018-11-19

## 2018-11-18 RX ORDER — DIPHENHYDRAMINE HCL 25 MG
25 CAPSULE ORAL EVERY 6 HOURS PRN
Status: DISCONTINUED | OUTPATIENT
Start: 2018-11-18 | End: 2018-11-19 | Stop reason: HOSPADM

## 2018-11-18 RX ORDER — LIDOCAINE HYDROCHLORIDE 10 MG/ML
20 INJECTION, SOLUTION EPIDURAL; INFILTRATION; INTRACAUDAL; PERINEURAL ONCE
Status: COMPLETED | OUTPATIENT
Start: 2018-11-18 | End: 2018-11-18

## 2018-11-18 RX ORDER — ACETAMINOPHEN 325 MG/1
650 TABLET ORAL EVERY 6 HOURS PRN
Status: DISCONTINUED | OUTPATIENT
Start: 2018-11-18 | End: 2018-11-19 | Stop reason: HOSPADM

## 2018-11-18 RX ORDER — RAMELTEON 8 MG/1
8 TABLET ORAL NIGHTLY PRN
Status: DISCONTINUED | OUTPATIENT
Start: 2018-11-18 | End: 2018-11-19 | Stop reason: HOSPADM

## 2018-11-18 RX ORDER — CALCIUM CARBONATE 200(500)MG
500 TABLET,CHEWABLE ORAL 3 TIMES DAILY PRN
Status: DISCONTINUED | OUTPATIENT
Start: 2018-11-18 | End: 2018-11-19 | Stop reason: HOSPADM

## 2018-11-18 RX ORDER — AMLODIPINE BESYLATE 10 MG/1
10 TABLET ORAL
Status: COMPLETED | OUTPATIENT
Start: 2018-11-18 | End: 2018-11-18

## 2018-11-18 RX ORDER — PROMETHAZINE HYDROCHLORIDE 12.5 MG/1
12.5 TABLET ORAL EVERY 6 HOURS PRN
Status: DISCONTINUED | OUTPATIENT
Start: 2018-11-18 | End: 2018-11-19

## 2018-11-18 RX ORDER — OXYCODONE AND ACETAMINOPHEN 10; 325 MG/1; MG/1
1 TABLET ORAL EVERY 4 HOURS PRN
Status: DISCONTINUED | OUTPATIENT
Start: 2018-11-18 | End: 2018-11-19

## 2018-11-18 RX ORDER — DOCUSATE SODIUM 100 MG/1
100 CAPSULE, LIQUID FILLED ORAL 2 TIMES DAILY
Status: DISCONTINUED | OUTPATIENT
Start: 2018-11-18 | End: 2018-11-19 | Stop reason: HOSPADM

## 2018-11-18 RX ORDER — POLYETHYLENE GLYCOL 3350 17 G/17G
17 POWDER, FOR SOLUTION ORAL 2 TIMES DAILY PRN
Status: DISCONTINUED | OUTPATIENT
Start: 2018-11-18 | End: 2018-11-19 | Stop reason: HOSPADM

## 2018-11-18 RX ADMIN — CLOSTRIDIUM TETANI TOXOID ANTIGEN (FORMALDEHYDE INACTIVATED), CORYNEBACTERIUM DIPHTHERIAE TOXOID ANTIGEN (FORMALDEHYDE INACTIVATED), BORDETELLA PERTUSSIS TOXOID ANTIGEN (GLUTARALDEHYDE INACTIVATED), BORDETELLA PERTUSSIS FILAMENTOUS HEMAGGLUTININ ANTIGEN (FORMALDEHYDE INACTIVATED), BORDETELLA PERTUSSIS PERTACTIN ANTIGEN, AND BORDETELLA PERTUSSIS FIMBRIAE 2/3 ANTIGEN 0.5 ML: 5; 2; 2.5; 5; 3; 5 INJECTION, SUSPENSION INTRAMUSCULAR at 03:11

## 2018-11-18 RX ADMIN — LIDOCAINE HYDROCHLORIDE 200 MG: 10 INJECTION, SOLUTION EPIDURAL; INFILTRATION; INTRACAUDAL at 04:11

## 2018-11-18 RX ADMIN — AMLODIPINE BESYLATE 10 MG: 10 TABLET ORAL at 06:11

## 2018-11-18 RX ADMIN — CIPROFLOXACIN 400 MG: 2 INJECTION, SOLUTION INTRAVENOUS at 04:11

## 2018-11-18 RX ADMIN — DIPHENHYDRAMINE HYDROCHLORIDE 25 MG: 50 INJECTION, SOLUTION INTRAMUSCULAR; INTRAVENOUS at 04:11

## 2018-11-18 RX ADMIN — RAMELTEON 8 MG: 8 TABLET, FILM COATED ORAL at 11:11

## 2018-11-18 RX ADMIN — OXYCODONE HYDROCHLORIDE AND ACETAMINOPHEN 1 TABLET: 10; 325 TABLET ORAL at 11:11

## 2018-11-18 RX ADMIN — CEFAZOLIN SODIUM 1 G: 1 INJECTION, POWDER, FOR SOLUTION INTRAMUSCULAR; INTRAVENOUS at 04:11

## 2018-11-18 RX ADMIN — MORPHINE SULFATE 6 MG: 4 INJECTION, SOLUTION INTRAMUSCULAR; INTRAVENOUS at 04:11

## 2018-11-18 RX ADMIN — MORPHINE SULFATE 4 MG: 4 INJECTION, SOLUTION INTRAMUSCULAR; INTRAVENOUS at 08:11

## 2018-11-18 NOTE — ASSESSMENT & PLAN NOTE
Danielito Rich is a 48 y.o. male with left small finger puncture wound and infection at MCP joint, neurovascular intact.  -Admitted to ortho  -ID consult  -NPO midnight  -OR in am for I and D.  -Cipro given in ER.

## 2018-11-18 NOTE — HPI
Danielito Rich is a 48 y.o. male presents to the ED with chief complaint of right hand - 5th finger pain status post fall x 5 days ago through a rotten bridge and stuck a splinter in his left small finger MCP. He feels like he has a splinter still in the wound. He reports increased pain and tenderness with surrounding erythema  over the last several days and pain with flexion of digit. He denies fevers or chills. But feels warmth in the digit.  He received Tdap in ER and 2 grams ancef.

## 2018-11-18 NOTE — ED NOTES
Patient identifiers verified and correct for Mr Rich  C/C: redness to palm of right hand near 5th finger  APPEARANCE: awake and alert in NAD.  SKIN: warm, dry. Healing brown color scab to top join of right hand with surrounding redness  MUSCULOSKELETAL: Patient moving all extremities spontaneously, no obvious swelling or deformities noted. Ambulates independently.  RESPIRATORY: Denies shortness of breath.Respirations unlabored.   CARDIAC: Denies CP, 2+ distal pulses; no peripheral edema  ABDOMEN: S/ND/NT, Denies nausea  : voids spontaneously, denies difficulty  Neurologic: AAO x 4; follows commands equal strength in all extremities; denies numbness/tingling. Denies dizziness

## 2018-11-18 NOTE — ED TRIAGE NOTES
"Patient states he slipped and  fell Tuesday with pain to 5th finger, may still have splinter in hand, slight redness noted. States he has been "digging" in with yellow/green drainage. Unknown temp.   "

## 2018-11-18 NOTE — ED PROVIDER NOTES
Encounter Date: 11/18/2018    SCRIBE #1 NOTE: I, Manav Hercules, am scribing for, and in the presence of,  Dr. Goodman. I have scribed the following portions of the note - the APC attestation.       History     Chief Complaint   Patient presents with    Hand Injury     Patient with wooden plinter right pinky with redness and swelling noted to right hand.     48 y.o. M with PMH significant for HTN presents to the ED with chief complaint of right hand - 5th finger pain status post fall x 4 days ago. Patient with injury to this finger >20 years ago and has some permanent decreased ROM. He reports falling on a wooden board at his barn and noted to have a large splinter, which he removed. He reports increased pain and tenderness with surrounding erythema and induration. On exam, patient with 5th finger in flexion, pain with extension, erythema extending to distal tip of finger and to palm, scant purulent drainage from open wound. Sensation intact, no paraesthesias. Denies fever, chills, nausea, vomiting, malaise, pain radiating up the extremity, paraesthesia, chest pain and SOB          Review of patient's allergies indicates:   Allergen Reactions    Codeine Itching     Past Medical History:   Diagnosis Date    Hypertension      Past Surgical History:   Procedure Laterality Date    APPENDECTOMY      FRACTURE SURGERY       History reviewed. No pertinent family history.  Social History     Tobacco Use    Smoking status: Never Smoker    Smokeless tobacco: Never Used   Substance Use Topics    Alcohol use: No    Drug use: No     Review of Systems   Constitutional: Negative for activity change, appetite change, chills, diaphoresis, fatigue and fever.   HENT: Negative for congestion, rhinorrhea and sore throat.    Respiratory: Negative for cough, chest tightness and shortness of breath.    Cardiovascular: Negative for chest pain and palpitations.   Gastrointestinal: Negative for abdominal pain.   Genitourinary: Negative  for dysuria.   Musculoskeletal: Positive for joint swelling. Negative for back pain, gait problem, myalgias, neck pain and neck stiffness. Arthralgias: MCP - 5th finger, right hand.   Skin: Positive for wound (right 5th finger at MCP).   Neurological: Positive for dizziness. Negative for tremors and weakness.   Psychiatric/Behavioral: Negative for agitation. The patient is not nervous/anxious.        Physical Exam     Initial Vitals [11/18/18 1308]   BP Pulse Resp Temp SpO2   (!) 166/107 106 18 97.7 °F (36.5 °C) 98 %      MAP       --         Physical Exam    Nursing note and vitals reviewed.  Constitutional: He appears well-developed and well-nourished. No distress.   HENT:   Head: Normocephalic and atraumatic.   Eyes: Conjunctivae and EOM are normal. Pupils are equal, round, and reactive to light.   Neck: Normal range of motion.   Cardiovascular: Normal rate, regular rhythm and normal heart sounds.   Pulmonary/Chest: Breath sounds normal. No respiratory distress. He has no wheezes. He has no rales.   Abdominal: Soft. Bowel sounds are normal. There is no tenderness.   Musculoskeletal:        Right hand: He exhibits decreased range of motion (right hand - 5th finger), tenderness and swelling. He exhibits normal two-point discrimination, normal capillary refill and no deformity. Normal sensation noted.        Hands:  Right hand, 5th finger - open wound at MCP joint with scant purulent drainage. Pain with AROM/PROM. Pulses intact. Surrounding erythema extending to palmar surface   Neurological: He is alert and oriented to person, place, and time.   Skin: Skin is warm and dry. No rash noted.   Psychiatric: He has a normal mood and affect.         ED Course   Procedures  Labs Reviewed   CBC W/ AUTO DIFFERENTIAL - Abnormal; Notable for the following components:       Result Value    MCH 31.3 (*)     All other components within normal limits   COMPREHENSIVE METABOLIC PANEL - Abnormal; Notable for the following components:     Glucose 68 (*)     BUN, Bld 21 (*)     Calcium 10.6 (*)     AST 62 (*)      (*)     All other components within normal limits   SEDIMENTATION RATE   C-REACTIVE PROTEIN          Imaging Results          X-Ray Hand 3 view Right (Final result)  Result time 11/18/18 14:44:12    Final result by Audrey Lobo MD (11/18/18 14:44:12)                 Impression:      No acute fracture.      Electronically signed by: Audrey Lobo MD  Date:    11/18/2018  Time:    14:44             Narrative:    EXAMINATION:  XR HAND COMPLETE 3 VIEW RIGHT    CLINICAL HISTORY:  injury;    TECHNIQUE:  PA, lateral, and oblique views of the right hand were performed.    COMPARISON:  None    FINDINGS:  Alignment is satisfactory.  Bone mineralization is normal.  There is no evidence of acute fracture, dislocation, or bone destruction.                                 Medical Decision Making:   History:   Old Medical Records: I decided to obtain old medical records.  Initial Assessment:   48 y.o. M with PMH of HTN presents to ED with chief complaint of pain to right hand - 5th finger. He reports fallingaon a wooden board x 4 days ago and getting a large splinter which he removed. His pain has increased since time of injury. On exam, he has an open wound at the MCP with scant purulent drainage, erythema extending to fingertip and palmar region. Pain with AROM/PROM, especially extension. He is most comfortable when finger is slightly flexed. He denies fever, chills, N/V, diarrhea, paraesthesia, malaise, headache, CP, SOB or any other systemic signs of infection.      CBC and CMP - normal (WBC 8.25), CRP- 2.9 and ESR- 8.     Ortho consulted to evaluate for flexor tenosynovitis, given physical examination. Following ortho evaluation, decision was made to have patient NPO. 1 gram of Ancef IV given. Patient reports pain of 6/10 following manipulation by ortho with Elevated /115. Morphine 6mg IV and Benadryl 25mg IV given, patient  reports his wife will come to drive him home if needed. Will await dispo per Ortho    5:49 PM elevated BP improved from 205/115 to 181/110 following IV pain medication, will continue to monitor.     I discussed the care of this patient with my supervising MD.    5:54 PM My shift is over. Patient reassigned to DEMAR Montaño PA-C/CARLOS Lazcano MD. Awaiting Ortho recs for further management and disposition.     Differential Diagnosis:   Hand fracture, soft tissue cellulitis, flexor tenosynovitis.   Clinical Tests:   Lab Tests: Ordered and Reviewed  Radiological Study: Ordered and Reviewed  ED Management:  15:45 I accepted the patient in hand off from EMILIANOAbimbola Souza at the end of her shift with disposition pending. I interviewed and examined the patient personally. The patient reports having pain, redness, swelling, and decreased ROM to the right 5th digit x 4 days. He reports moderate constant pain to the digit. It is held in the partially flexed position. There is pain to palpation of the flexor tendon sheath. He is unable to flex or extend the digit normally. No sausage digit appreciated. There is localized erythema, swelling and a scab to the proximal phalanx. Normal sensation. Normal cap refill. I reviewed labs and imaging. I discussed the case with the ER attending physician, Dr Lazcano. Finger abscess/cellulitis vs flexor tenosynovitis. Elevated BP noted - requested PO Norvasc 10 mg now. The patient has now been evaluated by orthopedics, who will admit the patient and plan to take to surgery. I did obtain images of the digit via Epic Haiku. I discussed the plan with the patient who verbalized understanding and agreement.   Other:   I have discussed this case with another health care provider.            Additional MDM:   X-Rays: I have independently interpreted X-Ray(s) - see notes.          Scribe Attestation:   Scribe #1: I performed the above scribed service and the documentation accurately describes the services I performed.  I attest to the accuracy of the note.    Attending Attestation:     Physician Attestation Statement for NP/PA:   I have conducted a face to face encounter with this patient in addition to the NP/PA, due to Medical Complexity          Attending ED Notes:   4:02 PM. Orthopedics resident has evaluated the patient and does not believe that he has flexor tenosynovitis. They recommend to hold the patient NPO, pending possible exploration.              Clinical Impression:   The primary encounter diagnosis was Finger infection. A diagnosis of Hypertension, unspecified type was also pertinent to this visit.      Disposition:   Disposition: Admitted  Condition: Stable                        Colby Montaño PA-C  11/18/18 6736

## 2018-11-18 NOTE — SUBJECTIVE & OBJECTIVE
"Past Medical History:   Diagnosis Date    Hypertension        Past Surgical History:   Procedure Laterality Date    APPENDECTOMY      FRACTURE SURGERY         Review of patient's allergies indicates:   Allergen Reactions    Codeine Itching       Current Facility-Administered Medications   Medication    ceFAZolin injection 1 g    [COMPLETED] lidocaine (PF) 10 mg/ml (1%) injection 200 mg     Current Outpatient Medications   Medication Sig    hydrocortisone-pramoxine (PROCTOFOAM-HS) rectal foam Place 1 applicator rectally 2 (two) times daily.     Family History     None        Tobacco Use    Smoking status: Never Smoker    Smokeless tobacco: Never Used   Substance and Sexual Activity    Alcohol use: No    Drug use: No    Sexual activity: Not on file     ROS   Per ed ros  Objective:     Vital Signs (Most Recent):  Temp: 97.7 °F (36.5 °C) (11/18/18 1308)  Pulse: 106 (11/18/18 1308)  Resp: 18 (11/18/18 1308)  BP: (!) 166/107 (11/18/18 1308)  SpO2: 98 % (11/18/18 1308) Vital Signs (24h Range):  Temp:  [97.7 °F (36.5 °C)] 97.7 °F (36.5 °C)  Pulse:  [106] 106  Resp:  [18] 18  SpO2:  [98 %] 98 %  BP: (166)/(107) 166/107     Weight: 81.6 kg (180 lb)  Height: 5' 6" (167.6 cm)  Body mass index is 29.05 kg/m².    No intake or output data in the 24 hours ending 11/18/18 1610    Ortho/SPM Exam   Vitals: Afebrile.  Vital signs stable.  General: No acute distress.  HEENT: Normocephalic. Atraumatic. Sclera anicteric. No tracheal deviation.  Cardio: Regular rate.  Chest: No increased work of breathing.  Abdominal: Nondistended.  Extremities: No cyanosis.  No clubbing.  No edema.  Palpable pulses.  Skin: No generalized rash.  Neuro: Awake. Alert. Oriented to person, place, time, and situation.  Psych: Normal appearance. Cooperative.  Appropriate mood.  Appropriate affect.      MSK:  RUE:   Skin intact  no ecchymoses  moderate swelling  Chronic deformity of FDP small finger  TTP over right small finger MCP joint  Compartments " soft and compressible  painful ROM at PIP and MCP right small finger  Tendons intact FDP FDS  SILT M/R/U  Motor intact AIN/PIN/M/U  Brisk cap refill  Warm well perfused extremities  2+ Radial palpable      Significant Labs:   CBC:   Recent Labs   Lab 11/18/18  1539   WBC 8.25   HGB 15.2   HCT 42.8        CRP:   Recent Labs   Lab 11/18/18  1539   CRP 2.9     All pertinent labs within the past 24 hours have been reviewed.    Significant Imaging: I have reviewed all pertinent imaging results/findings.   No foreign body on xray

## 2018-11-19 ENCOUNTER — ANESTHESIA (OUTPATIENT)
Dept: SURGERY | Facility: HOSPITAL | Age: 48
End: 2018-11-19
Payer: MEDICAID

## 2018-11-19 VITALS
SYSTOLIC BLOOD PRESSURE: 130 MMHG | RESPIRATION RATE: 12 BRPM | TEMPERATURE: 97 F | HEIGHT: 66 IN | DIASTOLIC BLOOD PRESSURE: 71 MMHG | OXYGEN SATURATION: 98 % | HEART RATE: 60 BPM | WEIGHT: 179.88 LBS | BODY MASS INDEX: 28.91 KG/M2

## 2018-11-19 LAB
GRAM STN SPEC: NORMAL
GRAM STN SPEC: NORMAL

## 2018-11-19 PROCEDURE — D9220A PRA ANESTHESIA: Mod: ,,, | Performed by: ANESTHESIOLOGY

## 2018-11-19 PROCEDURE — 99204 OFFICE O/P NEW MOD 45 MIN: CPT | Mod: 57,,, | Performed by: ORTHOPAEDIC SURGERY

## 2018-11-19 PROCEDURE — 99205 OFFICE O/P NEW HI 60 MIN: CPT | Mod: ,,, | Performed by: PHYSICIAN ASSISTANT

## 2018-11-19 PROCEDURE — G0378 HOSPITAL OBSERVATION PER HR: HCPCS

## 2018-11-19 PROCEDURE — 25000003 PHARM REV CODE 250: Performed by: STUDENT IN AN ORGANIZED HEALTH CARE EDUCATION/TRAINING PROGRAM

## 2018-11-19 PROCEDURE — 00400 ANES INTEGUMENTARY SYS NOS: CPT | Performed by: ORTHOPAEDIC SURGERY

## 2018-11-19 PROCEDURE — 87075 CULTR BACTERIA EXCEPT BLOOD: CPT

## 2018-11-19 PROCEDURE — 94761 N-INVAS EAR/PLS OXIMETRY MLT: CPT | Mod: 59

## 2018-11-19 PROCEDURE — 36000705 HC OR TIME LEV I EA ADD 15 MIN: Performed by: ORTHOPAEDIC SURGERY

## 2018-11-19 PROCEDURE — 87102 FUNGUS ISOLATION CULTURE: CPT

## 2018-11-19 PROCEDURE — 87118 MYCOBACTERIC IDENTIFICATION: CPT

## 2018-11-19 PROCEDURE — 87149 DNA/RNA DIRECT PROBE: CPT

## 2018-11-19 PROCEDURE — 87206 SMEAR FLUORESCENT/ACID STAI: CPT

## 2018-11-19 PROCEDURE — 25000003 PHARM REV CODE 250: Performed by: ORTHOPAEDIC SURGERY

## 2018-11-19 PROCEDURE — 10120 INC&RMVL FB SUBQ TISS SMPL: CPT | Mod: RT,,, | Performed by: ORTHOPAEDIC SURGERY

## 2018-11-19 PROCEDURE — 87077 CULTURE AEROBIC IDENTIFY: CPT

## 2018-11-19 PROCEDURE — 36000704 HC OR TIME LEV I 1ST 15 MIN: Performed by: ORTHOPAEDIC SURGERY

## 2018-11-19 PROCEDURE — 37000009 HC ANESTHESIA EA ADD 15 MINS: Performed by: ORTHOPAEDIC SURGERY

## 2018-11-19 PROCEDURE — 87186 SC STD MICRODIL/AGAR DIL: CPT

## 2018-11-19 PROCEDURE — 71000039 HC RECOVERY, EACH ADD'L HOUR: Performed by: ORTHOPAEDIC SURGERY

## 2018-11-19 PROCEDURE — 87076 CULTURE ANAEROBE IDENT EACH: CPT

## 2018-11-19 PROCEDURE — 87205 SMEAR GRAM STAIN: CPT

## 2018-11-19 PROCEDURE — 87070 CULTURE OTHR SPECIMN AEROBIC: CPT

## 2018-11-19 PROCEDURE — 88305 TISSUE EXAM BY PATHOLOGIST: CPT | Mod: 26,,, | Performed by: PATHOLOGY

## 2018-11-19 PROCEDURE — 99499 UNLISTED E&M SERVICE: CPT | Mod: ,,, | Performed by: PHYSICIAN ASSISTANT

## 2018-11-19 PROCEDURE — 27000221 HC OXYGEN, UP TO 24 HOURS

## 2018-11-19 PROCEDURE — 63600175 PHARM REV CODE 636 W HCPCS: Performed by: ANESTHESIOLOGY

## 2018-11-19 PROCEDURE — 71000033 HC RECOVERY, INTIAL HOUR: Performed by: ORTHOPAEDIC SURGERY

## 2018-11-19 PROCEDURE — 37000008 HC ANESTHESIA 1ST 15 MINUTES: Performed by: ORTHOPAEDIC SURGERY

## 2018-11-19 PROCEDURE — 87118 MYCOBACTERIC IDENTIFICATION: CPT | Mod: 59

## 2018-11-19 PROCEDURE — 87116 MYCOBACTERIA CULTURE: CPT

## 2018-11-19 PROCEDURE — 88305 TISSUE EXAM BY PATHOLOGIST: CPT | Performed by: PATHOLOGY

## 2018-11-19 PROCEDURE — 63600175 PHARM REV CODE 636 W HCPCS: Performed by: STUDENT IN AN ORGANIZED HEALTH CARE EDUCATION/TRAINING PROGRAM

## 2018-11-19 PROCEDURE — 25000003 PHARM REV CODE 250: Performed by: ANESTHESIOLOGY

## 2018-11-19 RX ORDER — DOCUSATE SODIUM 100 MG/1
100 CAPSULE, LIQUID FILLED ORAL 3 TIMES DAILY
Qty: 90 CAPSULE | Refills: 0 | Status: SHIPPED | OUTPATIENT
Start: 2018-11-19 | End: 2018-12-31

## 2018-11-19 RX ORDER — TAMSULOSIN HYDROCHLORIDE 0.4 MG/1
0.4 CAPSULE ORAL DAILY
Status: DISCONTINUED | OUTPATIENT
Start: 2018-11-19 | End: 2018-11-19 | Stop reason: HOSPADM

## 2018-11-19 RX ORDER — CIPROFLOXACIN 2 MG/ML
400 INJECTION, SOLUTION INTRAVENOUS ONCE
Status: DISCONTINUED | OUTPATIENT
Start: 2018-11-19 | End: 2018-11-19

## 2018-11-19 RX ORDER — PROPOFOL 10 MG/ML
VIAL (ML) INTRAVENOUS CONTINUOUS PRN
Status: DISCONTINUED | OUTPATIENT
Start: 2018-11-19 | End: 2018-11-19

## 2018-11-19 RX ORDER — ONDANSETRON 2 MG/ML
4 INJECTION INTRAMUSCULAR; INTRAVENOUS EVERY 12 HOURS PRN
Status: DISCONTINUED | OUTPATIENT
Start: 2018-11-19 | End: 2018-11-19 | Stop reason: HOSPADM

## 2018-11-19 RX ORDER — CIPROFLOXACIN 2 MG/ML
400 INJECTION, SOLUTION INTRAVENOUS ONCE
Status: COMPLETED | OUTPATIENT
Start: 2018-11-19 | End: 2018-11-19

## 2018-11-19 RX ORDER — POLYETHYLENE GLYCOL 3350 17 G/17G
17 POWDER, FOR SOLUTION ORAL DAILY
Qty: 1530 G | Refills: 0 | Status: SHIPPED | OUTPATIENT
Start: 2018-11-19 | End: 2018-12-31

## 2018-11-19 RX ORDER — HYDROCODONE BITARTRATE AND ACETAMINOPHEN 5; 325 MG/1; MG/1
1 TABLET ORAL EVERY 4 HOURS PRN
Status: DISCONTINUED | OUTPATIENT
Start: 2018-11-19 | End: 2018-11-19 | Stop reason: HOSPADM

## 2018-11-19 RX ORDER — SODIUM CHLORIDE 0.9 % (FLUSH) 0.9 %
3 SYRINGE (ML) INJECTION
Status: DISCONTINUED | OUTPATIENT
Start: 2018-11-19 | End: 2018-11-19 | Stop reason: HOSPADM

## 2018-11-19 RX ORDER — HYDROCODONE BITARTRATE AND ACETAMINOPHEN 10; 325 MG/1; MG/1
1 TABLET ORAL EVERY 4 HOURS PRN
Status: DISCONTINUED | OUTPATIENT
Start: 2018-11-19 | End: 2018-11-19 | Stop reason: HOSPADM

## 2018-11-19 RX ORDER — SULFAMETHOXAZOLE AND TRIMETHOPRIM 800; 160 MG/1; MG/1
1 TABLET ORAL 2 TIMES DAILY
Qty: 14 TABLET | Refills: 0 | Status: SHIPPED | OUTPATIENT
Start: 2018-11-19 | End: 2018-11-26

## 2018-11-19 RX ORDER — LIDOCAINE HYDROCHLORIDE 10 MG/ML
INJECTION, SOLUTION EPIDURAL; INFILTRATION; INTRACAUDAL; PERINEURAL
Status: DISCONTINUED | OUTPATIENT
Start: 2018-11-19 | End: 2018-11-19 | Stop reason: HOSPADM

## 2018-11-19 RX ORDER — MIDAZOLAM HYDROCHLORIDE 1 MG/ML
INJECTION, SOLUTION INTRAMUSCULAR; INTRAVENOUS
Status: DISCONTINUED | OUTPATIENT
Start: 2018-11-19 | End: 2018-11-19

## 2018-11-19 RX ORDER — PROPOFOL 10 MG/ML
VIAL (ML) INTRAVENOUS
Status: DISCONTINUED | OUTPATIENT
Start: 2018-11-19 | End: 2018-11-19

## 2018-11-19 RX ORDER — PHENYLEPHRINE HYDROCHLORIDE 10 MG/ML
INJECTION INTRAVENOUS
Status: DISCONTINUED | OUTPATIENT
Start: 2018-11-19 | End: 2018-11-19

## 2018-11-19 RX ORDER — SODIUM CHLORIDE 9 MG/ML
INJECTION, SOLUTION INTRAVENOUS CONTINUOUS PRN
Status: DISCONTINUED | OUTPATIENT
Start: 2018-11-19 | End: 2018-11-19

## 2018-11-19 RX ORDER — HYDROCODONE BITARTRATE AND ACETAMINOPHEN 10; 325 MG/1; MG/1
1 TABLET ORAL EVERY 4 HOURS PRN
Qty: 47 TABLET | Refills: 0 | Status: SHIPPED | OUTPATIENT
Start: 2018-11-19 | End: 2018-12-31

## 2018-11-19 RX ORDER — FENTANYL CITRATE 50 UG/ML
INJECTION, SOLUTION INTRAMUSCULAR; INTRAVENOUS
Status: DISCONTINUED | OUTPATIENT
Start: 2018-11-19 | End: 2018-11-19

## 2018-11-19 RX ORDER — FENTANYL CITRATE 50 UG/ML
25 INJECTION, SOLUTION INTRAMUSCULAR; INTRAVENOUS EVERY 5 MIN PRN
Status: DISCONTINUED | OUTPATIENT
Start: 2018-11-19 | End: 2018-11-19 | Stop reason: HOSPADM

## 2018-11-19 RX ORDER — PROMETHAZINE HYDROCHLORIDE 25 MG/1
25 TABLET ORAL EVERY 4 HOURS PRN
Qty: 50 TABLET | Refills: 0 | Status: SHIPPED | OUTPATIENT
Start: 2018-11-19 | End: 2018-12-31

## 2018-11-19 RX ORDER — ACETAMINOPHEN 325 MG/1
650 TABLET ORAL EVERY 4 HOURS PRN
Status: DISCONTINUED | OUTPATIENT
Start: 2018-11-19 | End: 2018-11-19

## 2018-11-19 RX ADMIN — OXYCODONE HYDROCHLORIDE AND ACETAMINOPHEN 1 TABLET: 10; 325 TABLET ORAL at 01:11

## 2018-11-19 RX ADMIN — PROPOFOL 40 MG: 10 INJECTION, EMULSION INTRAVENOUS at 02:11

## 2018-11-19 RX ADMIN — PRAMOXINE HYDROCHLORIDE AND HYDROCORTISONE ACETATE 1 APPLICATOR: 100; 100 AEROSOL, FOAM TOPICAL at 05:11

## 2018-11-19 RX ADMIN — PHENYLEPHRINE HYDROCHLORIDE 100 MCG: 10 INJECTION INTRAVENOUS at 03:11

## 2018-11-19 RX ADMIN — OXYCODONE HYDROCHLORIDE AND ACETAMINOPHEN 1 TABLET: 10; 325 TABLET ORAL at 05:11

## 2018-11-19 RX ADMIN — FENTANYL CITRATE 25 MCG: 50 INJECTION, SOLUTION INTRAMUSCULAR; INTRAVENOUS at 02:11

## 2018-11-19 RX ADMIN — CIPROFLOXACIN 400 MG: 2 INJECTION, SOLUTION INTRAVENOUS at 11:11

## 2018-11-19 RX ADMIN — PHENYLEPHRINE HYDROCHLORIDE 50 MCG: 10 INJECTION INTRAVENOUS at 03:11

## 2018-11-19 RX ADMIN — PROMETHAZINE HYDROCHLORIDE 12.5 MG: 12.5 TABLET ORAL at 05:11

## 2018-11-19 RX ADMIN — OXYCODONE HYDROCHLORIDE AND ACETAMINOPHEN 1 TABLET: 10; 325 TABLET ORAL at 09:11

## 2018-11-19 RX ADMIN — SODIUM CHLORIDE: 0.9 INJECTION, SOLUTION INTRAVENOUS at 02:11

## 2018-11-19 RX ADMIN — TAMSULOSIN HYDROCHLORIDE 0.4 MG: 0.4 CAPSULE ORAL at 08:11

## 2018-11-19 RX ADMIN — PANTOPRAZOLE SODIUM 40 MG: 40 TABLET, DELAYED RELEASE ORAL at 08:11

## 2018-11-19 RX ADMIN — HYDROCODONE BITARTRATE AND ACETAMINOPHEN 1 TABLET: 10; 325 TABLET ORAL at 04:11

## 2018-11-19 RX ADMIN — MIDAZOLAM HYDROCHLORIDE 2 MG: 1 INJECTION, SOLUTION INTRAMUSCULAR; INTRAVENOUS at 02:11

## 2018-11-19 RX ADMIN — PROPOFOL 150 MCG/KG/MIN: 10 INJECTION, EMULSION INTRAVENOUS at 02:11

## 2018-11-19 NOTE — PROVIDER PROGRESS NOTES - EMERGENCY DEPT.
Encounter Date: 11/18/2018    ED Physician Progress Notes        Physician Note:   Patient accepted by Orthopedics.  Admit.

## 2018-11-19 NOTE — CONSULTS
[facility]  Infectious Disease  Consult Note    Patient Name: Danielito Rich  MRN: [id number]  Admission Date: 11/18/2018  Hospital Length of Stay: 0 days  Attending Physician: Jalen Coon MD  Primary Care Provider: Primary Doctor No       Inpatient consult to Infectious Diseases  Consult performed by: Sally Marie PA-C  Consult ordered by: Liang Plunkett MD          ID consult received. Chart being reviewed. Patient admitted with left small finger infection following rotten splinter penetration injury. Scheduled for I&D today. When taken to surgery please obtain gram stain and surgical cultures (aerobic, anaerobic, fungal, AFB).     Full consult note with recommendations to follow.      Thank you,  Sally Marie PA-C  [phone]

## 2018-11-19 NOTE — BRIEF OP NOTE
[facility]  Brief Operative Note    SUMMARY     Surgery Date: 11/19/2018     Surgeon(s) and Role:     * Jalen Coon MD - Primary     * Betito Samson MD - Resident - Assisting        Pre-op Diagnosis:  Finger infection [L08.9]    Post-op Diagnosis:  Post-Op Diagnosis Codes:     * Finger infection [L08.9]    Procedure(s) (LRB):  INCISION AND DRAINAGE, HAND. - right - hand table - bulb irrigation (Right)    Anesthesia: Choice    Description of Procedure: see op note             Specimens:   Specimen (12h ago, onward)    Start     Ordered    11/19/18 1508  Specimen to Pathology - Surgery  Once     Comments:  Right little finger     Start Status   11/19/18 1508 Collected (11/19/18 1509)       11/19/18 1509

## 2018-11-19 NOTE — NURSING
Ortho informed has been voiding small amounts and last bladder scan = 0 ml but feels like not emptying bladder completely.  No new orders.  Will continue to monitor

## 2018-11-19 NOTE — NURSING
Pharmacist informed pt did not receive 0530 Cipro dose because it is not in pyxis.  States there are 2 orders for cipro IVPB and waiting to hear back from MD before sending medication.  States will place a call to MD again.

## 2018-11-19 NOTE — ASSESSMENT & PLAN NOTE
Danielito Rich is a 48 y.o. male with left small finger puncture wound and infection at MCP joint, neurovascular intact.  -OR this am  -ID consult. recs pending  -NPO   -Cipro q12

## 2018-11-19 NOTE — TRANSFER OF CARE
"Anesthesia Transfer of Care Note    Patient: Danielito Rich    Procedure(s) Performed: Procedure(s) (LRB):  INCISION AND DRAINAGE, HAND. - right - hand table - bulb irrigation (Right)    Patient location: PACU    Anesthesia Type: general    Transport from OR: Transported from OR on 2-3 L/min O2 by NC with adequate spontaneous ventilation    Post pain: adequate analgesia    Post assessment: no apparent anesthetic complications    Post vital signs: stable    Level of consciousness: awake    Nausea/Vomiting: no nausea/vomiting    Complications: none    Transfer of care protocol was followed      Last vitals:   Visit Vitals  BP (!) 166/98 (BP Location: Right arm, Patient Position: Lying)   Pulse 82   Temp 36.4 °C (97.5 °F) (Oral)   Resp 18   Ht 5' 6" (1.676 m)   Wt 81.6 kg (179 lb 14.3 oz)   SpO2 96%   BMI 29.04 kg/m²     "

## 2018-11-19 NOTE — PT/OT/SLP PROGRESS
Occupational Therapy      Patient Name:  Danielito Rich   MRN:  [id number]    Patient not seen today secondary to (pt off floor to OR for I&D). Will follow-up at next scheduled session.    Shelby mock OT  11/19/2018

## 2018-11-19 NOTE — HPI
Danielito Rich is a 48 year old male presents to the ED with right 5th finger pain following a fall 5 days ago. The patient [place] and fell on a rotten piece of wood and a splinter penetrated through the skin of his right small finger at the MCP joint. He states the wood was damp and rotten. Since that time he reports increased pain with surrounding erythema over the last several days now resulting in decreased ROM 2/2 pain. He denies fevers, chills, sweats at home. He received Tdap in ER and has gotten a dose of cefazolin and ciprofloxacin. Orthopedic surgery is taking him to the OR today.

## 2018-11-19 NOTE — PROGRESS NOTES
"[facility]  Orthopedics  Progress Note    Patient Name: Danielito Rich  MRN: [id number]  Admission Date: 11/18/2018  Hospital Length of Stay: 0 days  Attending Provider: Jalen Coon MD  Primary Care Provider: Primary Doctor No  Follow-up For: Procedure(s) (LRB):  INCISION AND DRAINAGE, HAND. - right - hand table - bulb irrigation (Right)    Post-Operative Day:    Subjective:     Principal Problem:Finger infection    Principal Orthopedic Problem: same     Interval History: Patient seen and examined at bedside.  No acute events overnight.  Pain controlled.      Review of patient's allergies indicates:   Allergen Reactions    Codeine Itching       Current Facility-Administered Medications   Medication    acetaminophen tablet 650 mg    calcium carbonate 200 mg calcium (500 mg) chewable tablet 500 mg    ciprofloxacin (CIPRO)400mg/200ml D5W IVPB 400 mg    ciprofloxacin (CIPRO)400mg/200ml D5W IVPB 400 mg    diphenhydrAMINE capsule 25 mg    docusate sodium capsule 100 mg    hydrocortisone-pramoxine rectal foam 1 applicator    ondansetron tablet 8 mg    oxyCODONE-acetaminophen  mg per tablet 1 tablet    pantoprazole EC tablet 40 mg    polyethylene glycol packet 17 g    promethazine tablet 12.5 mg    ramelteon tablet 8 mg     Objective:     Vital Signs (Most Recent):  Temp: 98.6 °F (37 °C) (11/19/18 0519)  Pulse: 67 (11/19/18 0519)  Resp: 18 (11/19/18 0519)  BP: 122/76 (11/19/18 0519)  SpO2: 96 % (11/19/18 0519) Vital Signs (24h Range):  Temp:  [97.3 °F (36.3 °C)-98.8 °F (37.1 °C)] 98.6 °F (37 °C)  Pulse:  [] 67  Resp:  [18] 18  SpO2:  [96 %-98 %] 96 %  BP: (122-205)/() 122/76     Weight: 81.6 kg (179 lb 14.3 oz)  Height: 5' 6" (167.6 cm)  Body mass index is 29.04 kg/m².    No intake or output data in the 24 hours ending 11/19/18 0536    Ortho/SPM Exam   MSK:  RUE:   2 mm puncture wound at MCP volar aspect small finger  mild swelling  TTP over MCP  Compartments soft " and compressible  Pain with ROM small finger  Tendons intact FDP FDS  SILT M/R/U  Motor intact AIN/PIN/U  Brisk cap refill  Warm well perfused extremities  2+ Radial palpable    Significant Labs:   CBC:   Recent Labs   Lab 11/18/18  1539   WBC 8.25   HGB 15.2   HCT 42.8        CRP:   Recent Labs   Lab 11/18/18  1539   CRP 2.9     All pertinent labs within the past 24 hours have been reviewed.    Significant Imaging: I have reviewed all pertinent imaging results/findings.  No fracture or dislocation    Assessment/Plan:     * Right small finger infection    Danielito Rich is a 48 y.o. male with left small finger puncture wound and infection at MCP joint, neurovascular intact.  -OR this am  -ID consult. recs pending  -NPO   -Cipro q12           Liang Plunkett MD  Orthopedics  [facility]

## 2018-11-19 NOTE — ASSESSMENT & PLAN NOTE
48 year old male presents to the ED with right fifth finger infection following rotten splinter penetration injury. No systemic signs of infection at home and afebrile here. Received Tdap in ER and has gotten a dose of cefazolin and ciprofloxacin. Orthopedic surgery is planning for I&D in the OR today.       Plan  - Given plans for surgery, would hold further antibiotics as clinically stable to maximize yield of surgical cultures.  - When taken to surgery please obtain gram stain and surgical cultures (aerobic, anaerobic, fungal, AFB).  - Recommend starting empiric Vancomycin, Cefepime and Flagyl post operatively  - Will follow cultures to guide antibiotic therapy  - ID will follow.

## 2018-11-19 NOTE — PLAN OF CARE
Extended Emergency Contact Information  Primary Emergency Contact: Sheila Bernal   [facility]  Home Phone: [phone]  Relation: Other  Preferred language: English    Primary Doctor No    No future appointments.    Payor: MEDICAID / Plan: PENDING MEDICAID / Product Type: Government /     No Pharmacies Listed       11/19/18 1333   Discharge Assessment   Assessment Type Discharge Planning Assessment   Confirmed/corrected address and phone number on facesheet? No   Assessment information obtained from? Medical Record;Caregiver   Expected Length of Stay (days) 1   Communicated expected length of stay with patient/caregiver no  (OR)   Prior to hospitilization cognitive status: Unable to Assess   Prior to hospitalization functional status: Independent   Current cognitive status: Unable to Assess   Current Functional Status: Independent   Lives With significant other   Able to Return to Prior Arrangements yes   Is patient able to care for self after discharge? Yes   Readmission Within The Last 30 Days no previous admission in last 30 days   Patient currently being followed by outpatient case management? No   Patient currently receives any other outside agency services? No   Equipment Currently Used at Home none   Do you have any problems affording any of your prescribed medications? TBD   Is the patient taking medications as prescribed? yes   Does the patient have transportation home? Yes   Transportation Available family or friend will provide   Discharge Plan A Home   Discharge Plan B Home   Patient/Family In Agreement With Plan yes   Does the patient have transportation to healthcare appointments? Yes

## 2018-11-19 NOTE — PT/OT/SLP PROGRESS
Physical Therapy      Patient Name:  Danielito Rich   MRN:  [id number]    Patient not seen today secondary to (pt off floor to OR for I&D). Will follow-up at next scheduled session as able.    Rupa Mishra, PT, DPT   11/19/2018  [phone]

## 2018-11-19 NOTE — SUBJECTIVE & OBJECTIVE
"Principal Problem:Finger infection    Principal Orthopedic Problem: same     Interval History: Patient seen and examined at bedside.  No acute events overnight.  Pain controlled.      Review of patient's allergies indicates:   Allergen Reactions    Codeine Itching       Current Facility-Administered Medications   Medication    acetaminophen tablet 650 mg    calcium carbonate 200 mg calcium (500 mg) chewable tablet 500 mg    ciprofloxacin (CIPRO)400mg/200ml D5W IVPB 400 mg    ciprofloxacin (CIPRO)400mg/200ml D5W IVPB 400 mg    diphenhydrAMINE capsule 25 mg    docusate sodium capsule 100 mg    hydrocortisone-pramoxine rectal foam 1 applicator    ondansetron tablet 8 mg    oxyCODONE-acetaminophen  mg per tablet 1 tablet    pantoprazole EC tablet 40 mg    polyethylene glycol packet 17 g    promethazine tablet 12.5 mg    ramelteon tablet 8 mg     Objective:     Vital Signs (Most Recent):  Temp: 98.6 °F (37 °C) (11/19/18 0519)  Pulse: 67 (11/19/18 0519)  Resp: 18 (11/19/18 0519)  BP: 122/76 (11/19/18 0519)  SpO2: 96 % (11/19/18 0519) Vital Signs (24h Range):  Temp:  [97.3 °F (36.3 °C)-98.8 °F (37.1 °C)] 98.6 °F (37 °C)  Pulse:  [] 67  Resp:  [18] 18  SpO2:  [96 %-98 %] 96 %  BP: (122-205)/() 122/76     Weight: 81.6 kg (179 lb 14.3 oz)  Height: 5' 6" (167.6 cm)  Body mass index is 29.04 kg/m².    No intake or output data in the 24 hours ending 11/19/18 0536    Ortho/SPM Exam   MSK:  RUE:   2 mm puncture wound at MCP volar aspect small finger  mild swelling  TTP over MCP  Compartments soft and compressible  Pain with ROM small finger  Tendons intact FDP FDS  SILT M/R/U  Motor intact AIN/PIN/U  Brisk cap refill  Warm well perfused extremities  2+ Radial palpable    Significant Labs:   CBC:   Recent Labs   Lab 11/18/18  1539   WBC 8.25   HGB 15.2   HCT 42.8        CRP:   Recent Labs   Lab 11/18/18  1539   CRP 2.9     All pertinent labs within the past 24 hours have been " reviewed.    Significant Imaging: I have reviewed all pertinent imaging results/findings.  No fracture or dislocation

## 2018-11-19 NOTE — H&P
[facility]  Orthopedics  H&P    Patient Name: Danielito Rich  MRN: [id number]  Admission Date: 11/18/2018  Primary Care Provider: Primary Doctor No      Subjective:     Principal Problem:Finger infection, right small finger    Chief Complaint:   Chief Complaint   Patient presents with    Hand Injury     Patient with wooden plinter right pinky with redness and swelling noted to right hand.        HPI:  Danielito Rich is a 48 y.o. male presents to the ED with chief complaint of right hand - 5th finger pain status post fall x 5 days ago through a rotten bridge and stuck a splinter in his right small finger at the MCP. He feels like he has a splinter still in the wound. He reports increased pain and tenderness with surrounding erythema  over the last several days and pain with flexion of digit. He denies fevers or chills. But feels warmth in the digit.  He received Tdap in ER and ciprofloxacin             Past Medical History:   Diagnosis Date    Hypertension        Past Surgical History:   Procedure Laterality Date    APPENDECTOMY      FRACTURE SURGERY         Review of patient's allergies indicates:   Allergen Reactions    Codeine Itching       Current Facility-Administered Medications   Medication    ceFAZolin injection 1 g    [COMPLETED] lidocaine (PF) 10 mg/ml (1%) injection 200 mg     Current Outpatient Medications   Medication Sig    hydrocortisone-pramoxine (PROCTOFOAM-HS) rectal foam Place 1 applicator rectally 2 (two) times daily.     Family History     None        Tobacco Use    Smoking status: Never Smoker    Smokeless tobacco: Never Used   Substance and Sexual Activity    Alcohol use: No    Drug use: No    Sexual activity: Not on file     ROS   Per ed ros  Objective:     Vital Signs (Most Recent):  Temp: 97.7 °F (36.5 °C) (11/18/18 1308)  Pulse: 106 (11/18/18 1308)  Resp: 18 (11/18/18 1308)  BP: (!) 166/107 (11/18/18 1308)  SpO2: 98 % (11/18/18 1308) Vital Signs (24h  "Range):  Temp:  [97.7 °F (36.5 °C)] 97.7 °F (36.5 °C)  Pulse:  [106] 106  Resp:  [18] 18  SpO2:  [98 %] 98 %  BP: (166)/(107) 166/107     Weight: 81.6 kg (180 lb)  Height: 5' 6" (167.6 cm)  Body mass index is 29.05 kg/m².    No intake or output data in the 24 hours ending 11/18/18 1610    Ortho/SPM Exam   Vitals: Afebrile.  Vital signs stable.  General: No acute distress.  HEENT: Normocephalic. Atraumatic. Sclera anicteric. No tracheal deviation.  Cardio: Regular rate.  Chest: No increased work of breathing.  Abdominal: Nondistended.  Extremities: No cyanosis.  No clubbing.  No edema.  Palpable pulses.  Skin: No generalized rash.  Neuro: Awake. Alert. Oriented to person, place, time, and situation.  Psych: Normal appearance. Cooperative.  Appropriate mood.  Appropriate affect.      MSK:  RUE:   3 mm puncture wound proximal to MCP joint of small finger draining purulent fluid.  no ecchymoses  moderate swelling  Chronic deformity of FDP small finger  TTP over right small finger MCP joint  Compartments soft and compressible  painful ROM at PIP and MCP right small finger  Tendons intact FDP FDS  SILT M/R/U  Motor intact AIN/PIN/M/U  Brisk cap refill  Warm well perfused extremities  2+ Radial palpable      Significant Labs:   CBC:   Recent Labs   Lab 11/18/18  1539   WBC 8.25   HGB 15.2   HCT 42.8        CRP:   Recent Labs   Lab 11/18/18  1539   CRP 2.9     All pertinent labs within the past 24 hours have been reviewed.    Significant Imaging: I have reviewed all pertinent imaging results/findings.   No foreign body on xray    Assessment/Plan:     * Right small finger infection    Danielito Rich is a 48 y.o. male with left small finger puncture wound and infection at MCP joint, neurovascular intact.  -Admitted to ortho  -ID consult  -NPO midnight  -OR in am for Incision and drainage.  -Cipro q12 hrs  -Marked, booked and consented for OR  No Guarrantes were made, All questions were answered to patient's " satisfaction.              Liang Plunkett MD  Orthopedics  [facility]

## 2018-11-19 NOTE — CONSULTS
[facility]  Infectious Disease  Consult Note    Patient Name: Danielito Rich  MRN: [id number]  Admission Date: 11/18/2018  Hospital Length of Stay: 0 days  Attending Physician: Jalen Coon MD  Primary Care Provider: Primary Doctor No     Isolation Status: No active isolations    Patient information was obtained from patient and past medical records.      Consults  Assessment/Plan:     * Right small finger infection       48 year old male presents to the ED with right fifth finger infection following rotten splinter penetration injury. No systemic signs of infection at home and afebrile here. Received Tdap in ER and has gotten a dose of cefazolin and ciprofloxacin. Orthopedic surgery is planning for I&D in the OR today.       Plan  - Given plans for surgery, would hold further antibiotics as clinically stable to maximize yield of surgical cultures.  - When taken to surgery please obtain gram stain and surgical cultures (aerobic, anaerobic, fungal, AFB).  - Recommend starting empiric Vancomycin, Cefepime and Flagyl post operatively  - Will follow cultures to guide antibiotic therapy  - ID will follow.           Thank you for the consult. Please call for any questions.  Sally Marie PA-C  Phone: [phone]  Pager: [phone]    Subjective:     Principal Problem: Finger infection    HPI: Danielito Rich is a 48 year old male presents to the ED with right 5th finger pain following a fall 5 days ago. The patient was working outside on a bridge and fell on a rotten piece of wood and a splinter penetrated through the skin of his right small finger at the MCP  joint. He states the wood was damp and rotten. Since that time he reports increased pain with surrounding erythema over the last several days now resulting in decreased ROM 2/2 pain. He denies fevers, chills, sweats at home. He received Tdap in ER and  has gotten a dose of cefazolin and ciprofloxacin. Orthopedic surgery is taking him to the OR  today.    Past Medical History:   Diagnosis Date    Hypertension        Past Surgical History:   Procedure Laterality Date    APPENDECTOMY      FRACTURE SURGERY         Review of patient's allergies indicates:   Allergen Reactions    Codeine Itching       Medications:  No medications prior to admission.     Antibiotics (From admission, onward)    None        Antifungals (From admission, onward)    None        Antivirals (From admission, onward)    None           Immunization History   Administered Date(s) Administered    Tdap 11/18/2018       Family History     None        Social History     Socioeconomic History    Marital status:      Spouse name: None    Number of children: None    Years of education: None    Highest education level: None   Social Needs    Financial resource strain: None    Food insecurity - worry: None    Food insecurity - inability: None    Transportation needs - medical: None    Transportation needs - non-medical: None   Occupational History    None   Tobacco Use    Smoking status: Never Smoker    Smokeless tobacco: Never Used   Substance and Sexual Activity    Alcohol use: No    Drug use: No    Sexual activity: Yes     Partners: Female   Other Topics Concern    None   Social History Narrative    None     Review of Systems   Constitutional: Negative for appetite change, chills, diaphoresis, fatigue and fever.   Respiratory: Negative for cough and shortness of breath.    Cardiovascular: Negative for chest pain and leg swelling.   Gastrointestinal: Negative for abdominal pain, constipation, diarrhea and nausea.   Genitourinary: Negative for difficulty urinating, dysuria, frequency and hematuria.   Musculoskeletal: Positive for arthralgias and joint swelling. Negative for back pain.   Skin: Positive for color change (right finger) and wound (right finger). Negative for rash.   Neurological: Negative for dizziness, weakness, numbness and headaches.    Psychiatric/Behavioral: Negative for agitation and confusion. The patient is not nervous/anxious.    All other systems reviewed and are negative.    Objective:     Vital Signs (Most Recent):  Temp: 97.4 °F (36.3 °C) (11/19/18 1126)  Pulse: 66 (11/19/18 1126)  Resp: 12 (11/19/18 1126)  BP: 130/84 (11/19/18 1126)  SpO2: 96 % (11/19/18 1126) Vital Signs (24h Range):  Temp:  [97.2 °F (36.2 °C)-98.8 °F (37.1 °C)] 97.4 °F (36.3 °C)  Pulse:  [55-97] 66  Resp:  [12-18] 12  SpO2:  [96 %-98 %] 96 %  BP: (122-205)/() 130/84     Weight: 81.6 kg (179 lb 14.3 oz)  Body mass index is 29.04 kg/m².    Estimated Creatinine Clearance: 83.3 mL/min (based on SCr of 1.1 mg/dL).    Physical Exam   Constitutional: He is oriented to person, place, and time. He appears well-developed and well-nourished. No distress.   HENT:   Head: Normocephalic and atraumatic.   Mouth/Throat: No oropharyngeal exudate.   Eyes: Conjunctivae are normal. No scleral icterus.   Cardiovascular: Normal rate, regular rhythm and intact distal pulses.   Pulmonary/Chest: Effort normal and breath sounds normal. No respiratory distress.   Abdominal: Soft. Bowel sounds are normal. He exhibits no distension. There is no tenderness.   Musculoskeletal: He exhibits edema and tenderness (right 5th finger).   Decreased ROM of right 5th finger. Small wound at MCP joint that is crusted over with surrounding erythema, warmth and tenderness.    Neurological: He is alert and oriented to person, place, and time.   Skin: Skin is warm and dry. He is not diaphoretic. There is erythema.   Psychiatric: He has a normal mood and affect. His behavior is normal. Thought content normal.       Significant Labs: All pertinent labs within the past 24 hours have been reviewed.    Significant Imaging: I have reviewed all pertinent imaging results/findings within the past 24 hours.

## 2018-11-19 NOTE — ANESTHESIA PREPROCEDURE EVALUATION
[facility]  Anesthesia Pre-Operative Evaluation         Patient Name: Danielito Rich  YOB: 1970  MRN: [id number]    SUBJECTIVE:     Pre-operative evaluation for Procedure(s) (LRB):  INCISION AND DRAINAGE, HAND. - right - hand table - bulb irrigation (Right)     11/18/2018    Danielito Rich is a 48 y.o. male w/ a significant PMHx of PMH significant for HTN presents to the ED with chief complaint of right hand - 5th finger pain status post fall x 4 days ago. Concern for infection and now presents for the above procedure(s).      LDA:        Peripheral IV - Single Lumen 11/18/18 1604 Left Antecubital (Active)   Site Assessment Clean;Dry;Intact 11/18/2018  4:24 PM   Line Status Blood return noted 11/18/2018  4:24 PM   Dressing Status Clean;Dry;Intact 11/18/2018  4:24 PM   Dressing Intervention New dressing 11/18/2018  4:24 PM   Number of days: 0       Prev airway: None documented.    Drips: None documented.      Patient Active Problem List   Diagnosis    Chest pain    Other headache syndrome    Essential hypertension    Other hyperlipidemia    Right small finger infection       Review of patient's allergies indicates:   Allergen Reactions    Codeine Itching       Current Inpatient Medications:   amLODIPine  10 mg Oral ED 1 Time    ciprofloxacin  400 mg Intravenous Q12H    docusate sodium  100 mg Oral BID    [START ON 11/19/2018] pantoprazole  40 mg Oral Daily       No current facility-administered medications on file prior to encounter.      Current Outpatient Medications on File Prior to Encounter   Medication Sig Dispense Refill    hydrocortisone-pramoxine (PROCTOFOAM-HS) rectal foam Place 1 applicator rectally 2 (two) times daily. 10 g 1       Past Surgical History:   Procedure Laterality Date    APPENDECTOMY      FRACTURE SURGERY         Social History     Socioeconomic History    Marital status:      Spouse name: Not on file    Number of children: Not on  file    Years of education: Not on file    Highest education level: Not on file   Social Needs    Financial resource strain: Not on file    Food insecurity - worry: Not on file    Food insecurity - inability: Not on file    Transportation needs - medical: Not on file    Transportation needs - non-medical: Not on file   Occupational History    Not on file   Tobacco Use    Smoking status: Never Smoker    Smokeless tobacco: Never Used   Substance and Sexual Activity    Alcohol use: No    Drug use: No    Sexual activity: Not on file   Other Topics Concern    Not on file   Social History Narrative    Not on file       OBJECTIVE:     Vital Signs Range (Last 24H):  Temp:  [36.5 °C (97.7 °F)-37.1 °C (98.8 °F)]   Pulse:  []   Resp:  [18]   BP: (166-205)/(107-115)   SpO2:  [96 %-98 %]       Significant Labs:  Lab Results   Component Value Date    WBC 8.25 11/18/2018    HGB 15.2 11/18/2018    HCT 42.8 11/18/2018     11/18/2018    CHOL 198 01/02/2018    TRIG 128 01/02/2018    HDL 30 (L) 01/02/2018     (H) 11/18/2018    AST 62 (H) 11/18/2018     11/18/2018    K 4.1 11/18/2018     11/18/2018    CREATININE 1.1 11/18/2018    BUN 21 (H) 11/18/2018    CO2 26 11/18/2018    INR 1.0 01/02/2018       Diagnostic Studies: No relevant studies.    EKG: No recent studies available.    2D ECHO:  No results found for this or any previous visit.      ASSESSMENT/PLAN:         Anesthesia Evaluation    I have reviewed the Patient Summary Reports.    I have reviewed the Nursing Notes.   I have reviewed the Medications.     Review of Systems  Anesthesia Hx:  No problems with previous Anesthesia  History of prior surgery of interest to airway management or planning: Denies Family Hx of Anesthesia complications.   Denies Personal Hx of Anesthesia complications.   Social:  Non-Smoker, No Alcohol Use    Hematology/Oncology:  Hematology Normal   Oncology Normal     EENT/Dental:EENT/Dental Normal    Cardiovascular:   Hypertension    Pulmonary:  Pulmonary Normal    Renal/:  Renal/ Normal     Hepatic/GI:  Hepatic/GI Normal    Neurological:   Headaches    Endocrine:  Endocrine Normal        Physical Exam  General:  Well nourished    Airway/Jaw/Neck:  Airway Findings: Mouth Opening: Normal Tongue: Normal  General Airway Assessment: Adult, Good  Mallampati: II  Improves to II with phonation.  TM Distance: 4 - 6 cm       Chest/Lungs:  Chest/Lungs Clear    Heart/Vascular:  Heart Findings: Normal Heart murmur: negative       Mental Status:  Mental Status Findings:  Cooperative, Alert and Oriented         Anesthesia Plan  Type of Anesthesia, risks & benefits discussed:  Anesthesia Type:  MAC, general, regional  Patient's Preference:   Intra-op Monitoring Plan: standard ASA monitors  Intra-op Monitoring Plan Comments:   Post Op Pain Control Plan: per primary service following discharge from PACU, IV/PO Opioids PRN, multimodal analgesia and peripheral nerve block  Post Op Pain Control Plan Comments:   Induction:   IV  Beta Blocker:         Informed Consent: Patient understands risks and agrees with Anesthesia plan.  Questions answered. Anesthesia consent signed with patient.  ASA Score: 2     Day of Surgery Review of History & Physical:            Ready For Surgery From Anesthesia Perspective.

## 2018-11-19 NOTE — SUBJECTIVE & OBJECTIVE
Past Medical History:   Diagnosis Date    Hypertension        Past Surgical History:   Procedure Laterality Date    APPENDECTOMY      FRACTURE SURGERY         Review of patient's allergies indicates:   Allergen Reactions    Codeine Itching       Medications:  No medications prior to admission.     Antibiotics (From admission, onward)    None        Antifungals (From admission, onward)    None        Antivirals (From admission, onward)    None           Immunization History   Administered Date(s) Administered    Tdap 11/18/2018       Family History     None        Social History     Socioeconomic History    Marital status:      Spouse name: None    Number of children: None    Years of education: None    Highest education level: None   Social Needs    Financial resource strain: None    Food insecurity - worry: None    Food insecurity - inability: None    Transportation needs - medical: None    Transportation needs - non-medical: None   Occupational History    None   Tobacco Use    Smoking status: Never Smoker    Smokeless tobacco: Never Used   Substance and Sexual Activity    Alcohol use: No    Drug use: No    Sexual activity: Yes     Partners: Female   Other Topics Concern    None   Social History Narrative    None     Review of Systems   Constitutional: Negative for appetite change, chills, diaphoresis, fatigue and fever.   Respiratory: Negative for cough and shortness of breath.    Cardiovascular: Negative for chest pain and leg swelling.   Gastrointestinal: Negative for abdominal pain, constipation, diarrhea and nausea.   Genitourinary: Negative for difficulty urinating, dysuria, frequency and hematuria.   Musculoskeletal: Positive for arthralgias and joint swelling. Negative for back pain.   Skin: Positive for color change (right finger) and wound (right finger). Negative for rash.   Neurological: Negative for dizziness, weakness, numbness and headaches.   Psychiatric/Behavioral:  Negative for agitation and confusion. The patient is not nervous/anxious.    All other systems reviewed and are negative.    Objective:     Vital Signs (Most Recent):  Temp: 97.4 °F (36.3 °C) (11/19/18 1126)  Pulse: 66 (11/19/18 1126)  Resp: 12 (11/19/18 1126)  BP: 130/84 (11/19/18 1126)  SpO2: 96 % (11/19/18 1126) Vital Signs (24h Range):  Temp:  [97.2 °F (36.2 °C)-98.8 °F (37.1 °C)] 97.4 °F (36.3 °C)  Pulse:  [55-97] 66  Resp:  [12-18] 12  SpO2:  [96 %-98 %] 96 %  BP: (122-205)/() 130/84     Weight: 81.6 kg (179 lb 14.3 oz)  Body mass index is 29.04 kg/m².    Estimated Creatinine Clearance: 83.3 mL/min (based on SCr of 1.1 mg/dL).    Physical Exam   Constitutional: He is oriented to person, place, and time. He appears well-developed and well-nourished. No distress.   HENT:   Head: Normocephalic and atraumatic.   Mouth/Throat: No oropharyngeal exudate.   Eyes: Conjunctivae are normal. No scleral icterus.   Cardiovascular: Normal rate, regular rhythm and intact distal pulses.   Pulmonary/Chest: Effort normal and breath sounds normal. No respiratory distress.   Abdominal: Soft. Bowel sounds are normal. He exhibits no distension. There is no tenderness.   Musculoskeletal: He exhibits edema and tenderness (right 5th finger).   Decreased ROM of right 5th finger. Small wound at MCP joint that is crusted over with surrounding erythema, warmth and tenderness.    Neurological: He is alert and oriented to person, place, and time.   Skin: Skin is warm and dry. He is not diaphoretic. There is erythema.   Psychiatric: He has a normal mood and affect. His behavior is normal. Thought content normal.       Significant Labs: All pertinent labs within the past 24 hours have been reviewed.    Significant Imaging: I have reviewed all pertinent imaging results/findings within the past 24 hours.

## 2018-11-19 NOTE — OP NOTE
DATE OF PROCEDURE:  11/19/18     PREOPERATIVE DIAGNOSIS:  Superficial infection right small finger     POSTOPERATIVE DIAGNOSIS:  Same plus retained foreign body     PROCEDURE PERFORMED:  Incision and drainage of right small finger with foreign body removal     SURGEON:  Jalen Coon MD     ASSISTANTS:  Betito Samson     BLOOD LOSS:  Minimal.     SPECIMENS:  Foreign body from right small finger, fluid from right small finger abscess     INDICATION FOR PROCEDURE:  This is a pleasant 48-year-old male who was found to have superficial infection of right small finger.  Risks and benefits of surgery were discussed with the patient   in detail.  Consents were signed.  He was taken to the operating room   for operative procedure.     PROCEDURE IN DETAIL:  Right upper extremity was prepped and draped in a sterile   fashion.  A timeout was taken; site, side, and surgery were all agreed upon.  Arm was held up and   tourniquet was inflated.  A two limb Brunner type incision was center over proximal small finger crease at level of proximal phalanx where draining area of fluctuance was located.  Each limb was approximately 2cm in length.  After skin and dermis incised, subcutaneous tissues spread and explored.  Some loudy serous fluid was encounted.  Two 1cm foreign bodies resembling splinters were found and removed.  After adequately exploring small cavity and finding no other fluid collections or foreign bodies, the wound was then irrigated thoroughly with NS.  The skin was closed with 3-0 nylon.   A sterile dressing was applied.  The patient tolerated the procedure   without any complications and was wheeled into the PACU in stable condition.     PLAN FOR THE PATIENT:  The patient will remain weightbearing as tolerated.  He will be given prescription for PO Bactrim DS to be taken as outpatient for at least 7 days, at which point he will be seen in clinic for wound check.       I agree with the residents note as stated  above.  I was present and scrubbed in for the whole procedure

## 2018-11-19 NOTE — ANESTHESIA POSTPROCEDURE EVALUATION
"Anesthesia Post Evaluation    Patient: Danielito Rich    Procedure(s) Performed: Procedure(s) (LRB):  INCISION AND DRAINAGE, HAND. - right - hand table - bulb irrigation (Right)    Final Anesthesia Type: general  Patient location during evaluation: PACU  Patient participation: Yes- Able to Participate  Level of consciousness: awake and alert and oriented  Post-procedure vital signs: reviewed and stable  Pain management: adequate  Airway patency: patent  PONV status at discharge: No PONV  Anesthetic complications: no      Cardiovascular status: blood pressure returned to baseline  Respiratory status: unassisted  Hydration status: euvolemic  Follow-up not needed.        Visit Vitals  /68   Pulse 67   Temp 36.5 °C (97.7 °F) (Temporal)   Resp 15   Ht 5' 6" (1.676 m)   Wt 81.6 kg (179 lb 14.3 oz)   SpO2 100%   BMI 29.04 kg/m²       Pain/Kya Score: Pain Assessment Performed: Yes (11/19/2018  3:30 PM)  Presence of Pain: non-verbal indicators absent (11/19/2018  3:30 PM)  Pain Rating Prior to Med Admin: 6 (11/19/2018  4:38 PM)  Pain Rating Post Med Admin: 4 (11/19/2018 10:41 AM)  Kya Score: 8 (11/19/2018  3:30 PM)        "

## 2018-11-19 NOTE — NURSING TRANSFER
Nursing Transfer Note      11/19/2018     Transfer To: OR    Transfer via wheelchair    Transfer with na     Transported by surgery tech     Medicines sent: no    Chart send with patient: Yes    Notified: spouse @ bedside

## 2018-11-20 ENCOUNTER — TELEPHONE (OUTPATIENT)
Dept: ORTHOPEDICS | Facility: CLINIC | Age: 48
End: 2018-11-20

## 2018-11-20 NOTE — TELEPHONE ENCOUNTER
----- Message from Trina Pabon MA sent at 11/20/2018  3:24 PM CST -----  Contact: Self   Pt had sx on 11/19/18. How many days for post-op. Thanks    ----- Message -----  From: Jacque Orzoco  Sent: 11/20/2018   3:05 PM  To: Jacek [facility] Staff    Pt is requesting a call back in regards to scheduling a post op appt.       Pt can be contacted at [phone]

## 2018-11-20 NOTE — DISCHARGE SUMMARY
[facility]  Orthopedics  Discharge Summary      Patient Name: Danielito Rich  MRN: [id number]  Admission Date: 11/18/2018  Hospital Length of Stay: 0 days  Discharge Date and Time:  11/20/2018 8:38 AM  Attending Physician: No att. providers found   Discharging Provider: Betito Samson MD  Primary Care Provider: Primary Doctor No    HPI: Danielito Rich is a 48 y.o. male presents to the ED with chief complaint of right hand - 5th finger pain status post fall x 5 days ago through a rotten bridge and stuck a splinter in his right small finger at the MCP. He feels like he has a splinter still in the wound. He reports increased pain and tenderness with surrounding erythema  over the last several days and pain with flexion of digit. He denies fevers or chills. But feels warmth in the digit.  He received Tdap in ER and ciprofloxacin        Procedure(s) (LRB):  INCISION AND DRAINAGE, HAND. - right - hand table - bulb irrigation (Right)      Hospital Course: Patient presented and underwent above procedure.  Tolerated it well and was discharged home POD0 after voiding, tolerating diet, ambulating, pain controlled.  Discharge instructions, follow-up appointment, and med rec are below.  He will be on 7 day course of Bactrim.  We will f/u cultures, which are NGTD currently.  He will f/u in clinic for wound check in one week.      Consults (From admission, onward)        Status Ordering Provider     Inpatient consult to Infectious Diseases  Once     Provider:  (Not yet assigned)    Completed ELLIE ALEJO     Inpatient consult to Orthopedic Surgery  Once     Provider:  (Not yet assigned)    Completed CATIE MORFIN            Final Active Diagnoses:    Diagnosis Date Noted POA    PRINCIPAL PROBLEM:  Right small finger infection [L08.9] 11/18/2018 Yes      Problems Resolved During this Admission:      Discharged Condition: stable    Disposition: Home or Self Care    Follow Up:  Follow-up  Information     Ludy Read PA-C In 1 week.    Specialty:  Orthopedic Surgery  Contact information:  [address]  [facility] [address]  [phone]                 Patient Instructions:      Diet general     Call MD for:  temperature >100.4     Call MD for:  persistent nausea and vomiting     Call MD for:  severe uncontrolled pain     Call MD for:  difficulty breathing, headache or visual disturbances     Call MD for:  redness, tenderness, or signs of infection (pain, swelling, redness, odor or green/yellow discharge around incision site)     Call MD for:  hives     Call MD for:  persistent dizziness or light-headedness     Call MD for:  extreme fatigue     Sponge bath only until clinic visit     Keep surgical extremity elevated     Lifting restrictions   Order Comments: No lifting RUE     No driving, operating heavy equipment or signing legal documents while taking pain medication     Leave dressing on - Keep it clean, dry, and intact until clinic visit     Non weight bearing   Order Comments: nwb rue     Medications:  Reconciled Home Medications:      Medication List      START taking these medications    docusate sodium 100 MG capsule  Commonly known as:  COLACE  Take 1 capsule (100 mg total) by mouth 3 (three) times daily.     HYDROcodone-acetaminophen  mg per tablet  Commonly known as:  NORCO  Take 1 tablet by mouth every 4 (four) hours as needed for Pain. Patient is in the immediate postoperative period.     polyethylene glycol 17 gram/dose powder  Commonly known as:  GLYCOLAX  Take 17 g by mouth once daily.     promethazine 25 MG tablet  Commonly known as:  PHENERGAN  Take 1 tablet (25 mg total) by mouth every 4 (four) hours as needed for Nausea.     sulfamethoxazole-trimethoprim 800-160mg 800-160 mg Tab  Commonly known as:  BACTRIM DS  Take 1 tablet by mouth 2 (two) times daily. for 7 days        STOP taking these medications    ibuprofen 800 MG tablet  Commonly known as:  ADVIL,MOTRIN             Betito Samson MD  Orthopedics  [facility]

## 2018-11-21 ENCOUNTER — TELEPHONE (OUTPATIENT)
Dept: ORTHOPEDICS | Facility: CLINIC | Age: 48
End: 2018-11-21

## 2018-11-21 LAB — BACTERIA SPEC AEROBE CULT: NORMAL

## 2018-11-23 LAB — BACTERIA SPEC ANAEROBE CULT: NORMAL

## 2018-11-25 ENCOUNTER — HOSPITAL ENCOUNTER (EMERGENCY)
Facility: HOSPITAL | Age: 48
Discharge: HOME | End: 2018-11-25
Attending: EMERGENCY MEDICINE
Payer: MEDICAID

## 2018-11-25 VITALS
DIASTOLIC BLOOD PRESSURE: 88 MMHG | WEIGHT: 180 LBS | SYSTOLIC BLOOD PRESSURE: 143 MMHG | HEIGHT: 66 IN | RESPIRATION RATE: 16 BRPM | BODY MASS INDEX: 28.93 KG/M2 | TEMPERATURE: 98 F | HEART RATE: 110 BPM

## 2018-11-25 DIAGNOSIS — M79.641 PAIN OF RIGHT HAND: Primary | ICD-10-CM

## 2018-11-25 PROBLEM — Z51.89 ENCOUNTER FOR WOUND RE-CHECK: Status: ACTIVE | Noted: 2018-11-25

## 2018-11-25 PROCEDURE — 99284 EMERGENCY DEPT VISIT MOD MDM: CPT | Mod: ,,, | Performed by: EMERGENCY MEDICINE

## 2018-11-25 PROCEDURE — 99283 EMERGENCY DEPT VISIT LOW MDM: CPT | Mod: 25
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
## 2018-11-25 NOTE — ASSESSMENT & PLAN NOTE
Danielito Rich is a 48 y.o. male s/p I&D right small finger with new injury to right hand  - Pt with no acute fx or dislocation to hand, incision closed with no evidence of wound dehiscence or recurrent infection  - incision cleaned with betadine and R hand placed into new soft dressing  - continue bactrim   - follow up at regularly scheduled visit   - please call with any questions or concerns
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
## 2018-11-25 NOTE — SUBJECTIVE & OBJECTIVE
Past Medical History:   Diagnosis Date    Hypertension        Past Surgical History:   Procedure Laterality Date    APPENDECTOMY      FRACTURE SURGERY      INCISION AND DRAINAGE OF HAND Right 11/19/2018    Procedure: INCISION AND DRAINAGE, HAND. - right - hand table - bulb irrigation;  Surgeon: Jalen Coon MD;  Location: [facility] OR [address] [place] [address];  Service: Orthopedics;  Laterality: Right;    INCISION AND DRAINAGE, HAND. - right - hand table - bulb irrigation Right 11/19/2018    Performed by Jalen Coon MD at [facility] OR [address] [place] [address]       Review of patient's allergies indicates:   Allergen Reactions    Codeine Itching       No current facility-administered medications for this encounter.      Current Outpatient Medications   Medication Sig    docusate sodium (COLACE) 100 MG capsule Take 1 capsule (100 mg total) by mouth 3 (three) times daily.    HYDROcodone-acetaminophen (NORCO)  mg per tablet Take 1 tablet by mouth every 4 (four) hours as needed for Pain. Patient is in the immediate postoperative period.    polyethylene glycol (GLYCOLAX) 17 gram/dose powder Take 17 g by mouth once daily.    promethazine (PHENERGAN) 25 MG tablet Take 1 tablet (25 mg total) by mouth every 4 (four) hours as needed for Nausea.    sulfamethoxazole-trimethoprim 800-160mg (BACTRIM DS) 800-160 mg Tab Take 1 tablet by mouth 2 (two) times daily. for 7 days     Family History     None        Tobacco Use    Smoking status: Never Smoker    Smokeless tobacco: Never Used   Substance and Sexual Activity    Alcohol use: No    Drug use: No    Sexual activity: Yes     Partners: Female     ROS  Objective:     Vital Signs (Most Recent):  Temp: 97.8 °F (36.6 °C) (11/25/18 0614)  Pulse: 110 (11/25/18 0238)  Resp: 16 (11/25/18 0614)  BP: (!) 143/88 (11/25/18 0614) Vital Signs (24h Range):  Temp:  [97.8 °F (36.6 °C)-97.9 °F (36.6 °C)] 97.8 °F (36.6 °C)  Pulse:  [110] 110  Resp:  [16] 16  BP: (143-153)/(88-93) 143/88     Weight: 81.6 kg  "(180 lb)  Height: 5' 6" (167.6 cm)  Body mass index is 29.05 kg/m².    Gen: No acute distress  HEENT: normocephalic, atraumatic  CV: regular rate  Chest: symmetric, nonlabored respirations    Ortho/SPM Exam   RUE:   incision over the palmar MCP jt- c/d/i with nylon sutures in place  No surrounding erythema, drainage, palpable fluctuance, s/s infection  .5cm superficial abrasion zone 6 dorsal right hand and flexor zone 5  No bony TTP  SILT M/U/R  Motor intact AIN/PIN/M/U/R   Cap refill < 2s  2+ RP        Significant Imaging: I have reviewed and interpreted all pertinent imaging results/findings.   No acute fx or dislocation, no significant changes from 111/18  "

## 2018-11-26 ENCOUNTER — HOSPITAL ENCOUNTER (OUTPATIENT)
Dept: RADIOLOGY | Facility: HOSPITAL | Age: 48
Discharge: HOME | End: 2018-11-26
Attending: ORTHOPAEDIC SURGERY
Payer: MEDICAID

## 2018-11-26 ENCOUNTER — OFFICE VISIT (OUTPATIENT)
Dept: ORTHOPEDICS | Facility: CLINIC | Age: 48
End: 2018-11-26
Payer: MEDICAID

## 2018-11-26 VITALS
SYSTOLIC BLOOD PRESSURE: 147 MMHG | WEIGHT: 180 LBS | HEART RATE: 113 BPM | BODY MASS INDEX: 28.93 KG/M2 | HEIGHT: 66 IN | DIASTOLIC BLOOD PRESSURE: 105 MMHG

## 2018-11-26 DIAGNOSIS — M25.561 RIGHT KNEE PAIN, UNSPECIFIED CHRONICITY: ICD-10-CM

## 2018-11-26 DIAGNOSIS — M25.561 RIGHT KNEE PAIN, UNSPECIFIED CHRONICITY: Primary | ICD-10-CM

## 2018-11-26 PROCEDURE — 99024 POSTOP FOLLOW-UP VISIT: CPT | Mod: ,,, | Performed by: ORTHOPAEDIC SURGERY

## 2018-11-26 PROCEDURE — 99213 OFFICE O/P EST LOW 20 MIN: CPT | Mod: PBBFAC,25 | Performed by: ORTHOPAEDIC SURGERY

## 2018-11-26 PROCEDURE — 73560 X-RAY EXAM OF KNEE 1 OR 2: CPT | Mod: TC,RT

## 2018-11-26 PROCEDURE — 99999 PR PBB SHADOW E&M-EST. PATIENT-LVL III: CPT | Mod: PBBFAC,,, | Performed by: ORTHOPAEDIC SURGERY

## 2018-11-26 PROCEDURE — 73560 X-RAY EXAM OF KNEE 1 OR 2: CPT | Mod: 26,RT,, | Performed by: RADIOLOGY
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
## 2018-12-21 LAB — FUNGUS SPEC CULT: NORMAL

## 2018-12-31 ENCOUNTER — HOSPITAL ENCOUNTER (EMERGENCY)
Facility: HOSPITAL | Age: 48
Discharge: HOME | End: 2018-12-31
Attending: EMERGENCY MEDICINE
Payer: MEDICAID

## 2018-12-31 VITALS
DIASTOLIC BLOOD PRESSURE: 99 MMHG | OXYGEN SATURATION: 97 % | WEIGHT: 180 LBS | TEMPERATURE: 98 F | RESPIRATION RATE: 18 BRPM | BODY MASS INDEX: 28.25 KG/M2 | HEART RATE: 84 BPM | HEIGHT: 67 IN | SYSTOLIC BLOOD PRESSURE: 138 MMHG
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
## 2018-12-31 DIAGNOSIS — S61.212A LACERATION OF RIGHT MIDDLE FINGER WITHOUT FOREIGN BODY WITHOUT DAMAGE TO NAIL, INITIAL ENCOUNTER: Primary | ICD-10-CM
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
## 2018-12-31 PROCEDURE — 99283 EMERGENCY DEPT VISIT LOW MDM: CPT | Mod: 25

## 2018-12-31 PROCEDURE — 99284 PR EMERGENCY DEPT VISIT,LEVEL IV: ICD-10-PCS | Mod: 25,,, | Performed by: PHYSICIAN ASSISTANT

## 2018-12-31 PROCEDURE — 12001 PR RESUPERF WND BODY <2.5CM: ICD-10-PCS | Mod: F7,,, | Performed by: PHYSICIAN ASSISTANT

## 2018-12-31 PROCEDURE — 99284 EMERGENCY DEPT VISIT MOD MDM: CPT | Mod: 25,,, | Performed by: PHYSICIAN ASSISTANT

## 2018-12-31 PROCEDURE — 25000003 PHARM REV CODE 250: Performed by: PHYSICIAN ASSISTANT

## 2018-12-31 PROCEDURE — 12001 RPR S/N/AX/GEN/TRNK 2.5CM/<: CPT

## 2018-12-31 PROCEDURE — 12001 RPR S/N/AX/GEN/TRNK 2.5CM/<: CPT | Mod: F7,,, | Performed by: PHYSICIAN ASSISTANT

## 2018-12-31 RX ORDER — LIDOCAINE HYDROCHLORIDE 10 MG/ML
10 INJECTION INFILTRATION; PERINEURAL
Status: COMPLETED | OUTPATIENT
Start: 2018-12-31 | End: 2018-12-31

## 2018-12-31 RX ADMIN — NEOMYCIN AND POLYMYXIN B SULFATES AND BACITRACIN ZINC: 400; 3.5; 5 OINTMENT TOPICAL at 06:12

## 2018-12-31 RX ADMIN — LIDOCAINE HYDROCHLORIDE 10 ML: 10 INJECTION, SOLUTION INFILTRATION; PERINEURAL at 05:12

## 2018-12-31 NOTE — ED PROVIDER NOTES
Encounter Date: 12/31/2018       History     Chief Complaint   Patient presents with    Laceration     L middle finger, bleeding controlled     Mr Rich is a 47 yo  male patient with PMHx of HTN that presents to the ED with laceration to right middle finger. Pt states that he cut it on a piece of metal at work. Pt is complaining of localized pain to the finger. Pt has full range of motion of finger with no loss of sensation. Pt recently had a tdap last month. Bleeding is controlled on arrival to ED.  Pt denies any chest pain, shortness of breath, abdominal pain, N/V/D, headaches, numbness, dizziness, fevers, chills, myalgias.          Review of patient's allergies indicates:   Allergen Reactions    Codeine Itching     Past Medical History:   Diagnosis Date    Hypertension      Past Surgical History:   Procedure Laterality Date    APPENDECTOMY      FRACTURE SURGERY      INCISION AND DRAINAGE, HAND. - right - hand table - bulb irrigation Right 11/19/2018    Performed by Jalen Coon MD at [facility] OR [address] [place] [address]     History reviewed. No pertinent family history.  Social History     Tobacco Use    Smoking status: Never Smoker    Smokeless tobacco: Never Used   Substance Use Topics    Alcohol use: No    Drug use: No     Review of Systems   Constitutional: Negative for chills and fever.   HENT: Negative for congestion, rhinorrhea, sinus pressure, sinus pain and sore throat.    Eyes: Negative for photophobia and pain.   Respiratory: Negative for cough and shortness of breath.    Cardiovascular: Negative for chest pain, palpitations and leg swelling.   Gastrointestinal: Negative for abdominal pain, diarrhea, nausea and vomiting.   Genitourinary: Negative for dysuria, flank pain and frequency.   Musculoskeletal: Negative for arthralgias, back pain, gait problem, myalgias, neck pain and neck stiffness.   Skin: Positive for wound (RIGHT MIDDLE FINGER). Negative for pallor and rash.   Neurological: Negative  for dizziness, syncope, weakness, light-headedness and headaches.       Physical Exam     Initial Vitals [12/31/18 1456]   BP Pulse Resp Temp SpO2   (!) 138/99 84 18 97.9 °F (36.6 °C) 97 %      MAP       --         Physical Exam    Constitutional: He appears well-developed and well-nourished. He is cooperative. He does not appear ill. No distress.   HENT:   Head: Normocephalic and atraumatic.   Eyes: Pupils are equal, round, and reactive to light.   Neck: Normal range of motion. Neck supple.   Cardiovascular: Normal rate and intact distal pulses. Exam reveals no gallop and no friction rub.    No murmur heard.  Pulmonary/Chest: Effort normal. No respiratory distress. He has no wheezes. He has no rhonchi.   Abdominal: Soft. Bowel sounds are normal. There is no tenderness.   Musculoskeletal:   Pt has two cm laceration to right middle finger. No active bleeding. Neurovascularly intact. Full range of motion    Neurological: He is alert and oriented to person, place, and time.   Skin: Skin is warm and dry.         ED Course   Lac Repair  Date/Time: 12/31/2018 8:41 PM  Performed by: Ovidio Navarro PA-C  Authorized by: Maria Teresa Alberts MD   Body area: upper extremity  Location details: right long finger  Laceration length: 2 cm  Foreign bodies: no foreign bodies  Tendon involvement: none  Nerve involvement: none  Anesthesia: digital block    Anesthesia:  Local Anesthetic: lidocaine 1% without epinephrine  Anesthetic total: 2 mL  Patient sedated: no  Preparation: Patient was prepped and draped in the usual sterile fashion.  Irrigation solution: saline  Irrigation method: syringe  Amount of cleaning: standard  Skin closure: 5-0 nylon  Number of sutures: 4  Technique: simple  Approximation difficulty: simple  Dressing: splint for protection and dressing applied  Patient tolerance: Patient tolerated the procedure well with no immediate complications        Labs Reviewed - No data to display       Imaging Results           X-Ray Finger 2 or More Views Right (Final result)  Result time 12/31/18 17:12:09    Final result by Rui Cronin MD (12/31/18 17:12:09)                 Impression:      As above      Electronically signed by: Rui Cronin MD  Date:    12/31/2018  Time:    17:12             Narrative:    EXAMINATION:  XR FINGER 2 OR MORE VIEWS RIGHT    CLINICAL HISTORY:  laceration;    COMPARISON:  11/25/2018    FINDINGS:  Three views right 3rd digit.    No acute displaced fracture or dislocation of the visualized 3rd digit.  No radiopaque foreign body.                                 Medical Decision Making:   Initial Assessment:   Mr Rich is a 49 yo  male patient with PMHx of HTN that presents to the ED with laceration to right middle finger. Pt states that he cut it on a piece of metal at work. Pt is complaining of localized pain to the finger. Pt has full range of motion of finger with no loss of sensation. Pt recently had a tdap last month. Bleeding is controlled on arrival to ED.   Clinical Tests:   Radiological Study: Ordered and Reviewed  ED Management:  Pt hemodynamically stable on arrival to ED. Pt conversational and in no acute distress. Tetanus status up to date. Xray reveals no acute fractures or foreign bodies. Digital block administered to right middle finger. Laceration irrigated and repaired with four simple interrupted sutures. Pt tolerated procedure well with no complaints. Finger dressed and splinted. Plan is to discharge patient home and have him return in ten days for suture removal. Return instructions and plan explained and discussed with patient who is understanding and agreeable with plan. He was discharged with no new prescriptions.  All of the patient's questions were answered.  I reviewed the patient's chart, imaging and discussed the case with my supervising physician.                       Clinical Impression:   The encounter diagnosis was Laceration of right middle finger without  foreign body without damage to nail, initial encounter.      Disposition:   Disposition: Discharged  Condition: Stable                        Ovidio Navarro PA-C  12/31/18 [id number]

## 2018-12-31 NOTE — ED TRIAGE NOTES
Patient's name and date of birth checked and is correct.    LOC: The patient is awake, alert, and oriented to place, time, situation. Affect is appropriate.  Speech is appropriate and clear.      APPEARANCE: Patient resting comfortably, reporting palpation, light headedness,  in no acute distress.  Patient is clean and well groomed.     SKIN: The skin is warm and dry; color consistent with ethnicity.  Patient has normal skin turgor and moist mucus membranes.  Laceration to right index finger.     MUSCULOSKELETAL: Patient moving upper and lower extremities without difficulty.  Denies weakness.      RESPIRATORY: Airway is open and patent. Respirations spontaneous, even, easy, and non-labored.  Patient has a normal effort and rate.  No accessory muscle use noted. Denies cough.  BS clear.     CARDIAC:  No peripheral edema noted. No complaints of chest pain.       ABDOMEN: Soft and non tender to palpation.  No distention noted.      NEUROLOGIC: Eyes open spontaneously.  Behavior appropriate to situation.  Follows commands; facial expression symmetrical.  Purposeful motor response noted; normal sensation in all extremities.

## 2019-01-01 NOTE — DISCHARGE INSTRUCTIONS
Please return to the Emergency Department in ten days for suture removal. Please return sooner if you start developing increased pain, bleeding, fevers or developing any signs of infection.

## 2019-03-27 LAB — M TB DNA SPEC QL NAA+PROBE: ABNORMAL

## 2019-03-29 ENCOUNTER — TELEPHONE (OUTPATIENT)
Dept: INFECTIOUS DISEASES | Facility: CLINIC | Age: 49
End: 2019-03-29

## 2019-03-29 ENCOUNTER — TELEPHONE (OUTPATIENT)
Dept: ORTHOPEDICS | Facility: CLINIC | Age: 49
End: 2019-03-29

## 2019-03-29 NOTE — TELEPHONE ENCOUNTER
----- Message from Sally Marie PA-C sent at 3/29/2019  9:04 AM CDT -----  Regarding: F/U  Can you schedule follow up with an ID provider in the next two weeks? Sooner the better. Thanks

## 2019-03-29 NOTE — TELEPHONE ENCOUNTER
Dr. Coon received culture results positive for mycobacterium from patient's surgery in November. Contacted SEVERINO Nava in ID. She and Dr. Dailey reviewed the results and, per Dr. Dailey's recommendation, no further treatment is necessary at this time since the patient healed well and is asymptomatic.

## 2019-04-04 LAB
ACID FAST SUSCEPTIBILITY, SLOW GROWER: ABNORMAL
SPECIMEN SOURCE AND ORGANISM NAME: ABNORMAL

## 2019-04-11 LAB
ACID FAST MOD KINY STN SPEC: NORMAL
MYCOBACTERIUM SPEC QL CULT: NORMAL

## 2021-06-09 ENCOUNTER — HOSPITAL ENCOUNTER (EMERGENCY)
Facility: HOSPITAL | Age: 51
Discharge: HOME | End: 2021-06-09
Attending: EMERGENCY MEDICINE

## 2021-06-09 VITALS
RESPIRATION RATE: 21 BRPM | OXYGEN SATURATION: 95 % | DIASTOLIC BLOOD PRESSURE: 107 MMHG | HEART RATE: 61 BPM | TEMPERATURE: 98 F | BODY MASS INDEX: 28.19 KG/M2 | WEIGHT: 180 LBS | SYSTOLIC BLOOD PRESSURE: 149 MMHG

## 2021-06-09 DIAGNOSIS — R07.9 CHEST PAIN: ICD-10-CM

## 2021-06-09 LAB
ALBUMIN SERPL BCP-MCNC: 4.6 G/DL (ref 3.5–5.2)
ALP SERPL-CCNC: 73 U/L (ref 55–135)
ALT SERPL W/O P-5'-P-CCNC: 192 U/L (ref 10–44)
ANION GAP SERPL CALC-SCNC: 11 MMOL/L (ref 8–16)
AST SERPL-CCNC: 99 U/L (ref 10–40)
BASOPHILS # BLD AUTO: 0.03 K/UL (ref 0–0.2)
BASOPHILS NFR BLD: 0.5 % (ref 0–1.9)
BILIRUB SERPL-MCNC: 0.9 MG/DL (ref 0.1–1)
BUN SERPL-MCNC: 14 MG/DL (ref 6–20)
CALCIUM SERPL-MCNC: 10.2 MG/DL (ref 8.7–10.5)
CHLORIDE SERPL-SCNC: 106 MMOL/L (ref 95–110)
CO2 SERPL-SCNC: 23 MMOL/L (ref 23–29)
CREAT SERPL-MCNC: 1.1 MG/DL (ref 0.5–1.4)
D DIMER PPP IA.FEU-MCNC: <0.19 MG/L FEU
DIFFERENTIAL METHOD: ABNORMAL
EOSINOPHIL # BLD AUTO: 0.3 K/UL (ref 0–0.5)
EOSINOPHIL NFR BLD: 6.1 % (ref 0–8)
ERYTHROCYTE [DISTWIDTH] IN BLOOD BY AUTOMATED COUNT: 12.6 % (ref 11.5–14.5)
EST. GFR  (AFRICAN AMERICAN): >60 ML/MIN/1.73 M^2
EST. GFR  (NON AFRICAN AMERICAN): >60 ML/MIN/1.73 M^2
GLUCOSE SERPL-MCNC: 84 MG/DL (ref 70–110)
HCT VFR BLD AUTO: 44.7 % (ref 40–54)
HGB BLD-MCNC: 15.8 G/DL (ref 14–18)
IMM GRANULOCYTES # BLD AUTO: 0.03 K/UL (ref 0–0.04)
IMM GRANULOCYTES NFR BLD AUTO: 0.5 % (ref 0–0.5)
LYMPHOCYTES # BLD AUTO: 2.1 K/UL (ref 1–4.8)
LYMPHOCYTES NFR BLD: 38 % (ref 18–48)
MCH RBC QN AUTO: 32 PG (ref 27–31)
MCHC RBC AUTO-ENTMCNC: 35.3 G/DL (ref 32–36)
MCV RBC AUTO: 91 FL (ref 82–98)
MONOCYTES # BLD AUTO: 0.8 K/UL (ref 0.3–1)
MONOCYTES NFR BLD: 13.4 % (ref 4–15)
NEUTROPHILS # BLD AUTO: 2.3 K/UL (ref 1.8–7.7)
NEUTROPHILS NFR BLD: 41.5 % (ref 38–73)
NRBC BLD-RTO: 0 /100 WBC
PLATELET # BLD AUTO: 179 K/UL (ref 150–450)
PMV BLD AUTO: 9.3 FL (ref 9.2–12.9)
POTASSIUM SERPL-SCNC: 4.1 MMOL/L (ref 3.5–5.1)
PROT SERPL-MCNC: 8.3 G/DL (ref 6–8.4)
RBC # BLD AUTO: 4.94 M/UL (ref 4.6–6.2)
SODIUM SERPL-SCNC: 140 MMOL/L (ref 136–145)
TROPONIN I SERPL DL<=0.01 NG/ML-MCNC: 0.01 NG/ML (ref 0–0.03)
WBC # BLD AUTO: 5.6 K/UL (ref 3.9–12.7)

## 2021-06-09 PROCEDURE — 84484 ASSAY OF TROPONIN QUANT: CPT | Performed by: PHYSICIAN ASSISTANT

## 2021-06-09 PROCEDURE — 93005 ELECTROCARDIOGRAM TRACING: CPT

## 2021-06-09 PROCEDURE — 25000003 PHARM REV CODE 250: Performed by: EMERGENCY MEDICINE

## 2021-06-09 PROCEDURE — 85379 FIBRIN DEGRADATION QUANT: CPT | Performed by: EMERGENCY MEDICINE

## 2021-06-09 PROCEDURE — 85025 COMPLETE CBC W/AUTO DIFF WBC: CPT | Performed by: PHYSICIAN ASSISTANT

## 2021-06-09 PROCEDURE — 36000 PLACE NEEDLE IN VEIN: CPT

## 2021-06-09 PROCEDURE — 99285 EMERGENCY DEPT VISIT HI MDM: CPT | Mod: 25

## 2021-06-09 PROCEDURE — 93010 ELECTROCARDIOGRAM REPORT: CPT | Mod: ,,, | Performed by: INTERNAL MEDICINE

## 2021-06-09 PROCEDURE — 80053 COMPREHEN METABOLIC PANEL: CPT | Performed by: PHYSICIAN ASSISTANT

## 2021-06-09 PROCEDURE — 93010 EKG 12-LEAD: ICD-10-PCS | Mod: ,,, | Performed by: INTERNAL MEDICINE

## 2021-06-09 RX ORDER — ASPIRIN 325 MG
325 TABLET ORAL
Status: COMPLETED | OUTPATIENT
Start: 2021-06-09 | End: 2021-06-09

## 2021-06-09 RX ORDER — AMLODIPINE BESYLATE 5 MG/1
5 TABLET ORAL
Status: COMPLETED | OUTPATIENT
Start: 2021-06-09 | End: 2021-06-09

## 2021-06-09 RX ORDER — AMLODIPINE BESYLATE 5 MG/1
5 TABLET ORAL DAILY
Qty: 30 TABLET | Refills: 0 | Status: SHIPPED | OUTPATIENT
Start: 2021-06-09

## 2021-06-09 RX ORDER — ASPIRIN 81 MG/1
81 TABLET ORAL DAILY
Qty: 30 TABLET | Refills: 0 | Status: SHIPPED | OUTPATIENT
Start: 2021-06-09

## 2021-06-09 RX ADMIN — AMLODIPINE BESYLATE 5 MG: 5 TABLET ORAL at 01:06

## 2021-06-09 RX ADMIN — ASPIRIN 325 MG ORAL TABLET 325 MG: 325 PILL ORAL at 12:06

## 2021-08-04 NOTE — TELEPHONE ENCOUNTER
Spoke with pt.  States he is doing well.  Pt reports some pain post operatively.  Advised pt that he will need to come to clinic on Monday.  Will call pt tomorrow with an appointment time for Monday.  Pt verbalized understanding    No

## 2023-12-29 ENCOUNTER — HOSPITAL ENCOUNTER (EMERGENCY)
Facility: HOSPITAL | Age: 53
Discharge: HOME | End: 2023-12-29
Attending: STUDENT IN AN ORGANIZED HEALTH CARE EDUCATION/TRAINING PROGRAM

## 2023-12-29 VITALS
TEMPERATURE: 100 F | DIASTOLIC BLOOD PRESSURE: 75 MMHG | HEART RATE: 94 BPM | RESPIRATION RATE: 18 BRPM | OXYGEN SATURATION: 95 % | SYSTOLIC BLOOD PRESSURE: 154 MMHG

## 2023-12-29 DIAGNOSIS — M25.561 RIGHT KNEE PAIN: ICD-10-CM

## 2023-12-29 DIAGNOSIS — R50.9 FEBRILE ILLNESS: Primary | ICD-10-CM

## 2023-12-29 DIAGNOSIS — R00.0 TACHYCARDIA: ICD-10-CM

## 2023-12-29 DIAGNOSIS — S80.01XA CONTUSION OF RIGHT KNEE, INITIAL ENCOUNTER: ICD-10-CM

## 2023-12-29 LAB
BACTERIA #/AREA URNS AUTO: NORMAL /HPF
BASOPHILS # BLD AUTO: 0.03 K/UL (ref 0–0.2)
BASOPHILS NFR BLD: 0.2 % (ref 0–1.9)
BILIRUB UR QL STRIP: NEGATIVE
BUN SERPL-MCNC: 18 MG/DL (ref 6–30)
CHLORIDE SERPL-SCNC: 104 MMOL/L (ref 95–110)
CLARITY UR REFRACT.AUTO: CLEAR
COLOR UR AUTO: YELLOW
CREAT SERPL-MCNC: 1.2 MG/DL (ref 0.5–1.4)
DIFFERENTIAL METHOD BLD: ABNORMAL
EOSINOPHIL # BLD AUTO: 0 K/UL (ref 0–0.5)
EOSINOPHIL NFR BLD: 0.2 % (ref 0–8)
ERYTHROCYTE [DISTWIDTH] IN BLOOD BY AUTOMATED COUNT: 12.8 % (ref 11.5–14.5)
GLUCOSE SERPL-MCNC: 120 MG/DL (ref 70–110)
GLUCOSE UR QL STRIP: NEGATIVE
HCT VFR BLD AUTO: 40.3 % (ref 40–54)
HCT VFR BLD CALC: 44 %PCV (ref 36–54)
HGB BLD-MCNC: 15.1 G/DL (ref 14–18)
HGB UR QL STRIP: ABNORMAL
HIV 1+2 AB+HIV1 P24 AG SERPL QL IA: NORMAL
HYALINE CASTS UR QL AUTO: 0 /LPF
IMM GRANULOCYTES # BLD AUTO: 0.06 K/UL (ref 0–0.04)
IMM GRANULOCYTES NFR BLD AUTO: 0.5 % (ref 0–0.5)
INFLUENZA A, MOLECULAR: NEGATIVE
INFLUENZA B, MOLECULAR: NEGATIVE
KETONES UR QL STRIP: NEGATIVE
LEUKOCYTE ESTERASE UR QL STRIP: NEGATIVE
LYMPHOCYTES # BLD AUTO: 0.8 K/UL (ref 1–4.8)
LYMPHOCYTES NFR BLD: 6.7 % (ref 18–48)
MCH RBC QN AUTO: 32.2 PG (ref 27–31)
MCHC RBC AUTO-ENTMCNC: 37.5 G/DL (ref 32–36)
MCV RBC AUTO: 86 FL (ref 82–98)
MICROSCOPIC COMMENT: NORMAL
MONOCYTES # BLD AUTO: 0.7 K/UL (ref 0.3–1)
MONOCYTES NFR BLD: 5.3 % (ref 4–15)
NEUTROPHILS # BLD AUTO: 10.7 K/UL (ref 1.8–7.7)
NEUTROPHILS NFR BLD: 87.1 % (ref 38–73)
NITRITE UR QL STRIP: NEGATIVE
NRBC BLD-RTO: 0 /100 WBC
PH UR STRIP: 6 [PH] (ref 5–8)
PLATELET # BLD AUTO: 188 K/UL (ref 150–450)
PMV BLD AUTO: 8.8 FL (ref 9.2–12.9)
POC IONIZED CALCIUM: 1.17 MMOL/L (ref 1.06–1.42)
POC TCO2 (MEASURED): 22 MMOL/L (ref 23–29)
POTASSIUM BLD-SCNC: 3.9 MMOL/L (ref 3.5–5.1)
PROT UR QL STRIP: ABNORMAL
RBC # BLD AUTO: 4.69 M/UL (ref 4.6–6.2)
RBC #/AREA URNS AUTO: 2 /HPF (ref 0–4)
SAMPLE: ABNORMAL
SARS-COV-2 RDRP RESP QL NAA+PROBE: NEGATIVE
SODIUM BLD-SCNC: 137 MMOL/L (ref 136–145)
SP GR UR STRIP: >1.03 (ref 1–1.03)
SPECIMEN SOURCE: NORMAL
SQUAMOUS #/AREA URNS AUTO: 0 /HPF
URN SPEC COLLECT METH UR: ABNORMAL
WBC # BLD AUTO: 12.32 K/UL (ref 3.9–12.7)
WBC #/AREA URNS AUTO: 1 /HPF (ref 0–5)

## 2023-12-29 PROCEDURE — 93005 ELECTROCARDIOGRAM TRACING: CPT | Performed by: INTERNAL MEDICINE

## 2023-12-29 PROCEDURE — U0002 COVID-19 LAB TEST NON-CDC: HCPCS | Performed by: PHYSICIAN ASSISTANT

## 2023-12-29 PROCEDURE — 80047 BASIC METABLC PNL IONIZED CA: CPT

## 2023-12-29 PROCEDURE — 99285 EMERGENCY DEPT VISIT HI MDM: CPT

## 2023-12-29 PROCEDURE — 25000003 PHARM REV CODE 250: Performed by: PHYSICIAN ASSISTANT

## 2023-12-29 PROCEDURE — 81001 URINALYSIS AUTO W/SCOPE: CPT | Performed by: PHYSICIAN ASSISTANT

## 2023-12-29 PROCEDURE — 93010 ELECTROCARDIOGRAM REPORT: CPT | Mod: ,,, | Performed by: INTERNAL MEDICINE

## 2023-12-29 PROCEDURE — 87502 INFLUENZA DNA AMP PROBE: CPT | Performed by: PHYSICIAN ASSISTANT

## 2023-12-29 PROCEDURE — 87389 HIV-1 AG W/HIV-1&-2 AB AG IA: CPT | Performed by: PHYSICIAN ASSISTANT

## 2023-12-29 PROCEDURE — 93005 ELECTROCARDIOGRAM TRACING: CPT

## 2023-12-29 PROCEDURE — 85025 COMPLETE CBC W/AUTO DIFF WBC: CPT | Performed by: PHYSICIAN ASSISTANT

## 2023-12-29 RX ORDER — IBUPROFEN 600 MG/1
600 TABLET ORAL
Status: COMPLETED | OUTPATIENT
Start: 2023-12-29 | End: 2023-12-29

## 2023-12-29 RX ORDER — IBUPROFEN 600 MG/1
600 TABLET ORAL EVERY 6 HOURS PRN
Qty: 20 TABLET | Refills: 0 | Status: SHIPPED | OUTPATIENT
Start: 2023-12-29

## 2023-12-29 RX ADMIN — IBUPROFEN 600 MG: 600 TABLET, FILM COATED ORAL at 09:12

## 2023-12-30 NOTE — ED NOTES
I-STAT Chem-8+ Results:   Value Reference Range   Sodium 137 136-145 mmol/L   Potassium  3.9 3.5-5.1 mmol/L   Chloride 104  mmol/L   Ionized Calcium 1.17 1.06-1.42 mmol/L   CO2 (measured) 22 23-29 mmol/L   Glucose 120  mg/dL   BUN 18 6-30 mg/dL   Creatinine 1.2 0.5-1.4 mg/dL   Hematocrit 44 36-54%

## 2023-12-30 NOTE — FIRST PROVIDER EVALUATION
" Emergency Department TeleTriage Encounter Note      CHIEF COMPLAINT    Chief Complaint   Patient presents with    Fever     Pt c/o fever and generalized body aches x 3 hr. "My bone's hurt."       VITAL SIGNS   Initial Vitals [12/29/23 1904]   BP Pulse Resp Temp SpO2   (!) 132/90 (!) 125 18 (!) 101.9 °F (38.8 °C) 95 %      MAP       --            ALLERGIES    Review of patient's allergies indicates:   Allergen Reactions    Codeine Itching       PROVIDER TRIAGE NOTE  Patient presents with fever, shaking chills and body aches. Son with recent influenza. He was shaking so hard he fell and injured right knee. Reports taking tylenol prior to arrival.       ORDERS  Labs Reviewed   HIV 1 / 2 ANTIBODY       ED Orders (720h ago, onward)      Start Ordered     Status Ordering Provider    12/29/23 1909 12/29/23 1909  HIV 1/2 Ag/Ab (4th Gen)  STAT         Ordered CONNIE CHAIDEZ    12/29/23 1907 12/29/23 1908  EKG 12-lead  Once         Completed by FRANCIA VANCE on 12/29/2023 at  7:14 PM NICOLE ARREGUIN              Virtual Visit Note: The provider triage portion of this emergency department evaluation and documentation was performed via [employer], a HIPAA-compliant telemedicine application, in concert with a tele-presenter in the room. A face to face patient evaluation with one of my colleagues will occur once the patient is placed in an emergency department room.      DISCLAIMER: This note was prepared with [employer] voice recognition transcription software. Garbled syntax, mangled pronouns, and other bizarre constructions may be attributed to that software system.    "

## 2023-12-30 NOTE — ED NOTES
"Covid and Influenza swab attempted in [facility]. Pt refused to allow staff to swab him stating "I will swab myself" Pt advised that the swab did not go far into the nasal passage. Pt once again refused to allow staff to swab him and states "I will just wait"  "

## 2023-12-30 NOTE — ED PROVIDER NOTES
"Encounter Date: 12/29/2023       History     Chief Complaint   Patient presents with    Fever     Pt c/o fever and generalized body aches x 3 hr. "My bone's hurt."     This is a 53 y.o. year old male with a PMH of HTN and HLD who presents to the ED with a chief complaint of rigors. Patient reports acute onset shaking chills today around 3pm. He notes subjective fever. He was walking to the bedroom when he developed the rigors and states they caused him to fall. He landed on his right knee. He complains of pain and swelling to the right knee. He was able to bear weight to it. He does note Influenza exposure in the last week. He denies nasal congestion, cough, chest pain, shortness of breath, nausea, vomiting, diarrhea, abdominal pain, joint swelling or rashes. He is a nonsmoker.        Review of patient's allergies indicates:   Allergen Reactions    Codeine Itching     Past Medical History:   Diagnosis Date    Hyperlipemia     Hypertension      Past Surgical History:   Procedure Laterality Date    APPENDECTOMY      FRACTURE SURGERY      INCISION AND DRAINAGE OF HAND Right 11/19/2018    Procedure: INCISION AND DRAINAGE, HAND. - right - hand table - bulb irrigation;  Surgeon: Jalen Coon MD;  Location: [facility] OR [address] [place] [address];  Service: Orthopedics;  Laterality: Right;     History reviewed. No pertinent family history.  Social History     Tobacco Use    Smoking status: Never    Smokeless tobacco: Never   Substance Use Topics    Alcohol use: No    Drug use: No     Review of Systems   Constitutional:  Positive for chills and fever.   HENT:  Negative for congestion and sore throat.    Respiratory:  Negative for shortness of breath.    Cardiovascular:  Negative for chest pain.   Gastrointestinal:  Negative for nausea and vomiting.   Genitourinary:  Negative for dysuria.   Musculoskeletal:  Positive for arthralgias and joint swelling. Negative for back pain.   Skin:  Negative for rash.   Neurological:  Negative for weakness " and headaches.   Hematological:  Does not bruise/bleed easily.       Physical Exam     Initial Vitals [12/29/23 1904]   BP Pulse Resp Temp SpO2   (!) 132/90 (!) 125 18 (!) 101.9 °F (38.8 °C) 95 %      MAP       --         Physical Exam    Constitutional: He appears well-developed and well-nourished. No distress.   HENT:   Head: Atraumatic.   Right Ear: Ear canal normal. A middle ear effusion is present.   Left Ear: Ear canal normal. A middle ear effusion is present.   Eyes: Conjunctivae and EOM are normal. Pupils are equal, round, and reactive to light.   Cardiovascular:  Normal rate, regular rhythm and normal heart sounds.           Pulmonary/Chest: Breath sounds normal. No respiratory distress. He has no wheezes. He has no rhonchi. He has no rales.   Abdominal: Abdomen is soft. Bowel sounds are normal. There is no abdominal tenderness.   Musculoskeletal:      Right knee: Swelling present. No crepitus. Decreased range of motion. Tenderness present over the lateral joint line. No MCL laxity, ACL laxity or PCL laxity.     Neurological: He is alert and oriented to person, place, and time.   Skin: Skin is warm and dry. No rash noted.       ED Course   Procedures  Labs Reviewed   CBC W/ AUTO DIFFERENTIAL - Abnormal; Notable for the following components:       Result Value    MCH 32.2 (*)     MCHC 37.5 (*)     MPV 8.8 (*)     Gran # (ANC) 10.7 (*)     Immature Grans (Abs) 0.06 (*)     Lymph # 0.8 (*)     Gran % 87.1 (*)     Lymph % 6.7 (*)     All other components within normal limits   URINALYSIS, REFLEX TO URINE CULTURE - Abnormal; Notable for the following components:    Specific Gravity, UA >1.030 (*)     Protein, UA 1+ (*)     Occult Blood UA 1+ (*)     All other components within normal limits    Narrative:     Specimen Source->Urine   ISTAT PROCEDURE - Abnormal; Notable for the following components:    POC Glucose 120 (*)     POC TCO2 (MEASURED) 22 (*)     All other components within normal limits   INFLUENZA A &  B BY MOLECULAR   HIV 1 / 2 ANTIBODY    Narrative:     Release to patient->Immediate   SARS-COV-2 RNA AMPLIFICATION, QUAL   URINALYSIS MICROSCOPIC    Narrative:     Specimen Source->Urine   ISTAT CHEM8          Imaging Results              X-Ray Knee 3 View Right (Final result)  Result time 12/29/23 21:15:04      Final result by Sina Vyas MD (12/29/23 21:15:04)                   Impression:      No acute fracture.      Electronically signed by: Sina Vyas MD  Date:    12/29/2023  Time:    21:15               Narrative:    EXAMINATION:  XR KNEE 3 VIEW RIGHT    CLINICAL HISTORY:  Pain in right knee    TECHNIQUE:  AP, lateral, and Merchant views of the right knee were performed.    COMPARISON:  11/26/2018.    FINDINGS:  No fracture or dislocation.  No joint effusion.  Moderate degenerative narrowing in the lateral tibiofemoral compartment.                                       Medications   ibuprofen tablet 600 mg (600 mg Oral Given 12/29/23 2108)     Medical Decision Making  Amount and/or Complexity of Data Reviewed  Labs: ordered.    Risk  Prescription drug management.         APC / Resident Notes:   53 year old male presenting with fever and chills, right knee injury.    Knee exam with swelling, warmth, no joint laxity. No clinical concern for septic arthritis.    Labs are stable, viral panel negative for flu and covid.  UA negative.    Knee xray with no acute fracture.    Recommend supportive care, RICE for knee swelling. He desires discharge and is ready to eat. I discussed the care of this patient with my supervising MD.         ED Course as of 12/30/23 0020   Fri Dec 29, 2023   2156 I reviewed the EKG.  Sinus tachycardia, normal intervals, no ischemia [AC]      ED Course User Index  [AC] Emile Haile DO                           Clinical Impression:  Final diagnoses:  [R00.0] Tachycardia  [M25.561] Right knee pain  [R50.9] Febrile illness (Primary)  [S80.01XA] Contusion of right knee, initial  encounter          ED Disposition Condition    Discharge           ED Prescriptions       Medication Sig Dispense Start Date End Date Auth. Provider    ibuprofen (ADVIL,MOTRIN) 600 MG tablet Take 1 tablet (600 mg total) by mouth every 6 (six) hours as needed for Pain. 20 tablet 12/29/2023 -- Ines Ledbetter PA-C          Follow-up Information    None          Ines Ledbetter PA-C  12/30/23 0020       Ines Ledbetter PA-C  01/02/24 0812

## 2023-12-30 NOTE — DISCHARGE INSTRUCTIONS
If you develop cough/cold symptoms use an over the counter saline nasal spray and coricidin medications as directed. These are safe for patients with high blood pressure.

## 2023-12-30 NOTE — ED NOTES
"Danielito Rich, a 53 y.o. male presents to the ED w/ complaint of generalized weakness, pt stating "I know I just have the flu, my son just had it." Reports falling onto knee from shaking so bad.     Triage note:  Chief Complaint   Patient presents with    Fever     Pt c/o fever and generalized body aches x 3 hr. "My bone's hurt."     Review of patient's allergies indicates:   Allergen Reactions    Codeine Itching     Past Medical History:   Diagnosis Date    Hyperlipemia     Hypertension    Patient identifiers for Danielito Rich checked and correct.    LOC: The patient is awake, alert and aware of environment with an appropriate affect, the patient is oriented x 4 and speaking appropriately.  APPEARANCE: Patient resting comfortably and in no acute distress, patient is clean and well groomed, patient's clothing is properly fastened.  SKIN: The skin is warm and dry, color consistent with ethnicity, patient has normal skin turgor and moist mucus membranes, skin intact, no breakdown or bruising noted.  MUSCULOSKELETAL: Patient moving all extremities well, no obvious swelling or deformities noted. +generalized weakness  RESPIRATORY: Airway is open and patent, respirations are spontaneous and even, patient has a normal effort and rate.  CARDIAC: Patient has a normal rate and rhythm, no periphreal edema noted, capillary refill < 3 seconds. Normal +2 pedal pulses present.  ABDOMEN: Soft and non tender to palpation, no distention noted. Patient denies any nausea, vomiting, diarrhea, or constipation.   NEUROLOGIC: Eyes open spontaneously, PERRL, behavior appropriate to situation, follows commands, facial expression symmetrical, bilateral hand grasp equal and even, purposeful motor response noted, normal sensation in all extremities.       "

## (undated) DEVICE — ELECTRODE REM PLYHSV RETURN 9

## (undated) DEVICE — APPLICATOR CHLORAPREP ORN 26ML

## (undated) DEVICE — Device

## (undated) DEVICE — STOCKINET TUBULAR 1 PLY 6X60IN

## (undated) DEVICE — SEE MEDLINE ITEM 157131

## (undated) DEVICE — SOL IRR NACL .9% 3000ML

## (undated) DEVICE — KIT COLLECTION E SWAB REGULAR

## (undated) DEVICE — SEE MEDLINE ITEM 146372

## (undated) DEVICE — SPONGE LAP 18X18 PREWASHED

## (undated) DEVICE — DRESSING XEROFORM 1X8IN

## (undated) DEVICE — PENCIL ROCKER SWITCH 10FT CORD

## (undated) DEVICE — SEE MEDLINE ITEM 146298

## (undated) DEVICE — SEE MEDLINE ITEM 152515

## (undated) DEVICE — SLIDE STERILE FROSTED

## (undated) DEVICE — CONTAINER SPECIMEN STRL 4OZ

## (undated) DEVICE — PAD CAST SPECIALIST STRL 4

## (undated) DEVICE — GAUZE SPONGE 4X4 12PLY

## (undated) DEVICE — TRAY MINOR ORTHO

## (undated) DEVICE — BANDAGE ESMARK 6X12

## (undated) DEVICE — DRESSING XEROFORM FOIL PK 1X8